# Patient Record
Sex: MALE | Race: WHITE
[De-identification: names, ages, dates, MRNs, and addresses within clinical notes are randomized per-mention and may not be internally consistent; named-entity substitution may affect disease eponyms.]

---

## 2018-10-30 ENCOUNTER — HOSPITAL ENCOUNTER (EMERGENCY)
Dept: HOSPITAL 75 - ER | Age: 23
Discharge: HOME | End: 2018-10-30
Payer: COMMERCIAL

## 2018-10-30 VITALS — WEIGHT: 175 LBS | BODY MASS INDEX: 29.88 KG/M2 | HEIGHT: 64 IN

## 2018-10-30 VITALS — DIASTOLIC BLOOD PRESSURE: 83 MMHG | SYSTOLIC BLOOD PRESSURE: 127 MMHG

## 2018-10-30 DIAGNOSIS — Z88.8: ICD-10-CM

## 2018-10-30 DIAGNOSIS — Z88.1: ICD-10-CM

## 2018-10-30 DIAGNOSIS — K92.1: Primary | ICD-10-CM

## 2018-10-30 LAB
ALBUMIN SERPL-MCNC: 4.6 GM/DL (ref 3.2–4.5)
ALP SERPL-CCNC: 90 U/L (ref 40–136)
ALT SERPL-CCNC: 49 U/L (ref 0–55)
BASOPHILS # BLD AUTO: 0 10^3/UL (ref 0–0.1)
BASOPHILS NFR BLD AUTO: 0 % (ref 0–10)
BILIRUB SERPL-MCNC: 0.3 MG/DL (ref 0.1–1)
BUN/CREAT SERPL: 16
CALCIUM SERPL-MCNC: 9.5 MG/DL (ref 8.5–10.1)
CHLORIDE SERPL-SCNC: 108 MMOL/L (ref 98–107)
CO2 SERPL-SCNC: 23 MMOL/L (ref 21–32)
CREAT SERPL-MCNC: 0.9 MG/DL (ref 0.6–1.3)
EOSINOPHIL # BLD AUTO: 0.1 10^3/UL (ref 0–0.3)
EOSINOPHIL NFR BLD AUTO: 1 % (ref 0–10)
ERYTHROCYTE [DISTWIDTH] IN BLOOD BY AUTOMATED COUNT: 12.3 % (ref 10–14.5)
GFR SERPLBLD BASED ON 1.73 SQ M-ARVRAT: > 60 ML/MIN
GLUCOSE SERPL-MCNC: 95 MG/DL (ref 70–105)
HCT VFR BLD CALC: 43 % (ref 40–54)
HGB BLD-MCNC: 15.1 G/DL (ref 13.3–17.7)
LYMPHOCYTES # BLD AUTO: 1.7 X 10^3 (ref 1–4)
LYMPHOCYTES NFR BLD AUTO: 23 % (ref 12–44)
MANUAL DIFFERENTIAL PERFORMED BLD QL: NO
MCH RBC QN AUTO: 29 PG (ref 25–34)
MCHC RBC AUTO-ENTMCNC: 35 G/DL (ref 32–36)
MCV RBC AUTO: 81 FL (ref 80–99)
MONOCYTES # BLD AUTO: 0.6 X 10^3 (ref 0–1)
MONOCYTES NFR BLD AUTO: 8 % (ref 0–12)
NEUTROPHILS # BLD AUTO: 4.9 X 10^3 (ref 1.8–7.8)
NEUTROPHILS NFR BLD AUTO: 68 % (ref 42–75)
PLATELET # BLD: 247 10^3/UL (ref 130–400)
PMV BLD AUTO: 9.7 FL (ref 7.4–10.4)
POTASSIUM SERPL-SCNC: 3.8 MMOL/L (ref 3.6–5)
PROT SERPL-MCNC: 7.4 GM/DL (ref 6.4–8.2)
RBC # BLD AUTO: 5.26 10^6/UL (ref 4.35–5.85)
SODIUM SERPL-SCNC: 139 MMOL/L (ref 135–145)
WBC # BLD AUTO: 7.3 10^3/UL (ref 4.3–11)

## 2018-10-30 PROCEDURE — 80053 COMPREHEN METABOLIC PANEL: CPT

## 2018-10-30 PROCEDURE — 85025 COMPLETE CBC W/AUTO DIFF WBC: CPT

## 2018-10-30 PROCEDURE — 36415 COLL VENOUS BLD VENIPUNCTURE: CPT

## 2018-10-30 NOTE — XMS REPORT
[facility]

 Created on: 10/06/2017



Oliver Owens

External Reference #: [id number]

: 1995

Sex: Male



Demographics







 Address  [address] [place]  [address]

 

 Preferred Language  Unknown

 

 Marital Status  Unknown

 

 Taoism Affiliation  Unknown

 

 Race  Unknown

 

 Ethnic Group  Unknown





Author







 Author  Abdias  PADMA

 

 [facility]

 

 Address  [address] [place]  [address]



 

 Phone  [phone]







Care Team Providers







 Care Team Member Name  Role  Phone

 

 roxySEVEN StylesY  Unavailable  [phone]







PROBLEMS







 Type  Condition  ICD9-CM Code  AZZ50-DL Code  Onset Dates  Condition Status  
SNOMED Code

 

 Problem  Schizoaffective disorder, bipolar type     F25.0     Active  01452818

 

 Problem  Anxiety     F41.9     Active  06016564

 

 Problem  Attention deficit hyperactivity disorder (ADHD), predominantly 
inattentive type     F90.0     Active  93589068

 

 Problem  Impulse disorder, unspecified     F63.9     Active  06666214

 

 Problem  ADD (attention deficit disorder)     F90.0     Active  935997397

 

 Problem  Hypercholesterolemia with hyperglyceridemia     E78.2     Active  
[id number]

 

 Problem  Bipolar disorder, current episode mixed, unspecified     F31.60     
Active  [id number]







ALLERGIES







 Substance  Reaction  Event Type  Date  Status

 

 Cipro  hives  Drug Allergy  02 Mar, 2017  Active







SOCIAL HISTORY

Never Assessed



PLAN OF CARE







 Activity  Details









  









 Follow Up  3 Months Reason:







VITAL SIGNS







 Height  64 in  2017

 

 Weight  180.0 lbs  2017

 

 Heart Rate  72 bpm  2017

 

 Respiratory Rate  18   2017

 

 BMI  30.89 kg/m2  2017

 

 Blood pressure systolic  111 mmHg  2017

 

 Blood pressure diastolic  79 mmHg  2017







MEDICATIONS







 Medication  Instructions  Dosage  Frequency  Start Date  End Date  Duration  
Status

 

 Lithium Carbonate 300 MG     TAKE THREE CAPSULES BY MOUTH ONCE DAILY WITH 
DINNER              Active

 

 Latuda 20 MG  Orally BID  1 tablet with food  12h       30 days  
Active







RESULTS

No Results



PROCEDURES

No Known procedures



IMMUNIZATIONS

No Known Immunizations



MEDICAL (GENERAL) HISTORY







 Type  Description  Date

 

 Medical History  depression   

 

 Medical History  adhd   

 

 Medical History  bipolar   

 

 Medical History  hyperlipidemia   

 

 Medical History  Hx of heart valve dysfunction but says this has resolved   

 

 Hospitalization History  [facility] hospital x4, last incident at age 9

## 2018-10-30 NOTE — XMS REPORT
[facility]

 Created on: 2018



Oliver Owens

External Reference #: [id number]

: 1995

Sex: Male



Demographics







 Address  [address] [place]  [address]

 

 Preferred Language  Unknown

 

 Marital Status  Unknown

 

 Zoroastrian Affiliation  Unknown

 

 Race  Unknown

 

 Ethnic Group  Unknown





Author







 Author  PATTY MELGOZA

 

 [facility]

 

 Address  [address] [place]  [address]



 

 Phone  [phone]







Care Team Providers







 Care Team Member Name  Role  Phone

 

 RHONAPATTY  Unavailable  [phone]







PROBLEMS







 Type  Condition  ICD9-CM Code  IIQ90-JE Code  Onset Dates  Condition Status  
SNOMED Code

 

 Problem  ADD (attention deficit disorder)     F90.0     Active  552575111

 

 Problem  Hypercholesterolemia with hyperglyceridemia     E78.2     Active  
973892242

 

 Problem  Schizoaffective disorder, bipolar type     F25.0     Active  03148980

 

 Problem  Gastroesophageal reflux disease, esophagitis presence not specified  
   K21.9     Active  255093833

 

 Problem  Social anxiety disorder     F40.10     Active  31432680

 

 Problem  Bipolar disorder, current episode mixed, unspecified     F31.60     
Active  48185407

 

 Problem  Impulse disorder, unspecified     F63.9     Active  18043275

 

 Problem  Attention deficit hyperactivity disorder (ADHD), predominantly 
inattentive type     F90.0     Active  97975208

 

 Problem  Anxiety     F41.9     Active  54232141







ALLERGIES

No Information



ENCOUNTERS







 Encounter  Location  Date  Diagnosis

 

 [facility] [place]  [address] [place] [address]60-
9041    Gastroesophageal reflux disease, esophagitis presence not 
specified K21.9

 

 [facility], [place]  [address] [place] [address] 16115-
3458    Schizoaffective disorder, bipolar type F25.0

 

 [facility], [place]  [address] [place] [address] 16763-
7034    Schizoaffective disorder, bipolar type F25.0 ; Impulse 
disorder, unspecified F63.9 ; Attention deficit hyperactivity disorder (ADHD), 
inattentive type, mild F90.0 and Social anxiety disorder F40.10

 

 [facility], [place]  [address] [place] [address] 22746-
3879    Schizoaffective disorder, bipolar type F25.0 ; Impulse 
disorder, unspecified F63.9 ; Attention deficit hyperactivity disorder (ADHD), 
inattentive type, mild F90.0 and Social anxiety disorder F40.10

 

 [facility], [place]  [address] [place] [address] 37510-
2225  13 Dec, 2017  Schizoaffective disorder, bipolar type F25.0 ; Impulse 
disorder, unspecified F63.9 ; Attention deficit hyperactivity disorder (ADHD), 
inattentive type, mild F90.0 and Social anxiety disorder F40.10

 

 [place] [address] [place] [address] 88337-
4131  11 Dec, 2017   

 

 [facility], [place]  [id number] [place] [address] 91861-
2592    Schizoaffective disorder, bipolar type F25.0

 

 [place] [id number] [place] [address] 65609-
5472    Schizoaffective disorder, bipolar type F25.0 ; Impulse 
disorder, unspecified F63.9 ; Attention deficit hyperactivity disorder (ADHD), 
inattentive type, mild F90.0 and Social anxiety disorder F40.10

 

 [place] [address] [address]-
8855  12 Oct, 2017  Schizoaffective disorder, bipolar type F25.0 ; Impulse 
disorder, unspecified F63.9 ; Attention deficit hyperactivity disorder (ADHD), 
inattentive type, mild F90.0 and Social anxiety disorder F40.10

 

 [facility], [place]  [address] [address]-
4941  22 Sep, 2017   

 

 [facility] [place]  [address] [place] [address] 24861-
4336  08 Sep, 2017  Schizoaffective disorder, bipolar type F25.0

 

 [facility], [place]  [address] [place] [address] 18927-
6030  07 Sep, 2017  Other long term (current) drug therapy Z79.899

 

 [place] [address] [place] [id number] [place] [address] 24016-
4944  01 Sep, 2017  Schizoaffective disorder, bipolar type F25.0 ; Impulse 
disorder, unspecified F63.9 ; Attention deficit hyperactivity disorder (ADHD), 
predominantly inattentive type F90.0 ; Anxiety F41.9 and Other long term (
current) drug therapy Z79.899

 

 [facility], [place]  [id number] [place] [address] 66665-
4230  11 Aug, 2017   

 

 [facility], [place]  [id number] [place] [address] 84912-
6165    Hypercholesterolemia with hyperglyceridemia E78.2

 

 [place] [id number] [place] [address] 68559-
9013    Schizoaffective disorder, bipolar type F25.0 and Impulse 
disorder, unspecified F63.9

 

 [facility], [place]  [id number] [place] [address] 59592-
0993    Hypercholesterolemia with hyperglyceridemia E78.2 ; Impulse 
disorder, unspecified F63.9 ; Bipolar disorder, current episode mixed, 
unspecified F31.60 ; Schizoaffective disorder, bipolar type F25.0 and ADD (
attention deficit disorder) F90.0

 

 [place] [address] [place] [address] 24169-
0690  17 Mar, 2017   

 

 [facility] [place]  [id number] [place] [address] 88207-
6075  13 Mar, 2017   

 

 [facility] [place]  [id number] [place] [address] 15159-
7115  08 Mar, 2017  Schizoaffective disorder, bipolar type F25.0

 

 [facility] [place]  [id number] [place] [address] 99841-
6502  06 Mar, 2017   

 

 [facility] [place]  [id number] [place] [address] 78112-
0804  06 Mar, 2017   

 

 [facility] [place]  [id number] [place] [address] 85184-
6817  02 Mar, 2017  Schizoaffective disorder, bipolar type F25.0

 

 [facility] [place]  [address] [address]-
2882  02 Mar, 2017  Schizoaffective disorder, bipolar type F25.0

 

 [facility], [place]  [address]005[address] [address]-
0972  10 Nov, 2016   

 

 [facility] [place]  [id number] [place] [address] 74385-
5626     

 

 [facility] [place]  [id number] [place] [address] 73332-
3166    Schizoaffective disorder, bipolar type F25.0 and Other long 
term (current) drug therapy Z79.899

 

 [facility] [place]  [id number] [place] [address] 42218-
8898    ADD (attention deficit disorder) F90.0

 

 [facility] [place]  [id number] [place] [address] 58156-
5210    ADD (attention deficit disorder) F90.0

 

 [facility] [place]  [address] [place] [address] 24179-
1948    Schizoaffective disorder, bipolar type F25.0 and ADD (
attention deficit disorder) F90.0

 

 [facility], [place]  [address] [address]-
0914     

 

 [facility] [place]  [address] [address]-
9277  27 Oct, 2015   

 

 [facility] [place]  [address] [address]-
8672  09 Oct, 2015  Encounter for long-term (current) use of other medications 
Z79.899

 

 [facility] [place]  [address] [place] [address] 74391-
0817  07 Oct, 2015  Schizoaffective disorder, bipolar type F25.0 ; Anxiety 
disorder, unspecified F41.9 ; Impulse disorder, unspecified F63.9 and Encounter 
for long-term (current) use of other medications Z79.899

 

 [facility]  [address] [id number] [place] [address]-
0213  26 Aug, 2015   

 

 [facility] [place] [facility]  [address] [id number] [place] [address]-
7204  26 Aug, 2015   

 

 [facility] [place] [facility]  [address] [facility] [id number] [place] [address]-
6954     

 

 [facility] [place] [facility]  [address] [id number] [address]-
1189     

 

 [facility] [place] [facility]  [address] [id number] [place] [address]-
2563  12 May, 2015   

 

 [facility] [place] [facility]  [address] [id number] [address]-
6713     

 

 [facility] [place] [facility]  [address] [place] [address]-
5977     

 

 [facility] [place] [facility]  [id number] [address]-
7881  04 Mar, 2015   

 

 [facility] [place] [facility]  [id number] [address]-
6787  04 Mar, 2015   

 

 [facility] [place] [facility]  [id number] [address]-
9205     

 

 [facility] [place] [facility]  [id number] [address]-
1991     

 

 [facility] [place] [facility]  [address] [facility] [id number] [address]-
5842     

 

 [facility] [place] [facility]  [address] [id number] [address]-
4507     

 

 [facility] [place] [facility]  [address] [facility] [id number] [address]-
3877     

 

 [facility] [place] [facility]  [address] [facility] [id number] [address]-
7038     

 

 [facility] [place] [facility]  [address] [id number] [place] [address]-
3246     

 

 [facility]  [address] [id number] [place] [address]-
1242     

 

 [facility] [place] [facility]  [address] [id number] [place] [address]-
7380     

 

 [facility]  [address] [id number] [place] [address]-
6947     

 

 [facility] [place] [facility]  [address] [id number] [place] [address]-
7101  22 Dec, 2014   

 

 [facility]  [address] [id number] [place] [address]-
2327  22 Dec, 2014   

 

 [facility] [place] [facility]  [address] [id number] [place] [address]-
8288  02 Dec, 2014   

 

 [facility] [place] [facility]  [address] [id number] [place] [address]-
4613  02 Dec, 2014   

 

 [facility]  [address] [id number] [place] [address]-
5454  01 Dec, 2014   

 

 [facility] [place] [facility]  [address] [id number] [place] [address]-
3526  01 Dec, 2014   

 

 [facility]  [address] [id number] [place] [address]-
7163     

 

 [facility] [place] [facility]  [address] [id number] [place] [address]-
9426     

 

 [facility] [place] [facility]  [address] [id number] [place] [address]-
2306     

 

 [facility] [place] [facility]  [address] [id number] [place] [address]-
1936     

 

 [facility] [place] [facility]  [address] [id number] [place] [address]-
1016  30 Oct, 2014   

 

 [facility] [place] [facility]  [address] [id number] [place] [address]-
6087  30 Oct, 2014   

 

 [facility] [place] [facility]  [address] [id number] [place] [address]-
5410  27 Oct, 2014   

 

 [facility] [place] [facility]  [address] [id number] [place] [address]-
1448  27 Oct, 2014   

 

 [facility] [place] [facility]  [address] [id number] [place]
9533  06 Oct, 2014   

 

 [facility] [place] [facility]  [address] [id number] [place] [address]-
7466  06 Oct, 2014   

 

 [facility] [place] [facility]  [address] [id number] [place] [address]-
2031  03 Oct, 2014   

 

 [facility] [place] [facility]  [address] [id number] [place] [address]-
5753  03 Oct, 2014   

 

 [facility] [place] [facility]  [address] [id number] [place] [address]-
2107  05 Sep, 2014   

 

 [facility] [place] [facility]  [address] [id number] [place] [address]-
5028  05 Sep, 2014   

 

 [facility] [place] [facility]  [address] [id number] [place] [address]-
7194  05 Sep, 2014   

 

 [facility] [place] [facility]  [address] [id number] [place] [address]-
1284  05 Sep, 2014   

 

 [facility] [place] [facility]  [address] [id number] [place] [address]-
6071  04 Aug, 2014   

 

 [facility] [place] [facility]  [address] [id number] [address]-
0571  04 Aug, 2014   

 

 [facility] [place] [facility]  [address] [id number] [place] [address]-
8740     

 

 [facility] [place] [facility]  [address] [id number] [place] [address]-
7202     

 

 [facility] [place] [facility]  [address] [id number] [place] [address]-
6491     

 

 [facility] [place] [facility]  [address] [id number] [place] [address]-
9205     

 

 [facility] [place] [facility]  [address] [id number] [place] [address]-
2366     

 

 [facility] [place] [facility]  [address] [id number] [place] [address]-
4841     

 

 [facility] [place] [facility]  [address] [id number] [place] [address]-
5543     

 

 [facility] [place] [facility]  [address] [id number] [place] [address]-
0746     

 

 [facility] [place] [facility]  [address] [id number] [place] [address]-
5487     

 

 [facility] [place] [facility]  [address] [id number] [place] [address]-
0207     

 

 [facility] [place] [facility]  [address] [id number] [place] [address]-
6722  09 May, 2014   

 

 [facility] [place] [facility]  [address] [id number] [place] [address]-
4814  09 May, 2014   

 

 [facility] [place] [facility]  [address] [id number] [place] [address]-
6478     

 

 [facility] [place] [facility]  [address] [id number] [place] [address]-
6799     

 

 [facility] [place] [facility]  [address] [id number] [place] [address]-
1736     

 

 [facility] [place] [facility]  [address] [id number] [place] [address]-
4214     

 

 [facility] [place] [facility]  [address] [id number] [place] [address]-
8947     

 

 [facility] [place] [facility]  [address] [id number] [place] [address]-
1645  06 Mar, 2014   

 

 [facility] [place] [facility]  [address] [id number] [place] [address]-
1993  06 Mar, 2014   

 

 [facility] [place] [facility]  [address] [id number] [place] [address]-
8191     

 

 [facility]  [address] [id number] [place] [address]-
5989     

 

 [facility] [place] [facility]  [address] [id number] [place] [address]-
2634  10 Chidi, 2014   

 

 [facility] [place] [facility]  [address] [id number] [place] [address]-
4326  10 Chidi, 2014   

 

 [facility] [place] [facility]  [address] [id number] [place] [address]-
2641  23 Dec, 2013   

 

 [facility] [place] [facility]  [address] [id number] [place] [address]-
3397  23 Dec, 2013   

 

 [facility]  [address] [facility] [id number] [place] [address]-
6015  20 Dec, 2013   

 

 [facility] [place] [facility]  [address] [id number] [place] [address]-
6760  20 Dec, 2013   

 

 [facility]  [address] [id number] [place] [address]-
0934  19 Dec, 2013   

 

 [facility] [place] [facility]  [address] [id number] [place] [address]-
1860  05 Dec, 2013   

 

 [facility] [place] [facility]  [address] [id number] [place] [address]-
3093  05 Dec, 2013   

 

 [facility] [place] [facility]  [address] [id number] [place] [address]-
0720  03 Dec, 2013   

 

 [facility] [place] [facility]  [address] [id number] [place] [address]-
5212  03 Dec, 2013   

 

 [facility] [place] [facility]  [address] [id number] [place] [address]-
2486  18 Sep, 2013   

 

 [facility] [place] [facility]  [address] [id number] [place] [address]-
8336     

 

 [facility] [place] [facility]  [address] [id number] [address]-
8676     

 

 [facility]  [address] [id number] [place] [address]-
3923     

 

 [facility]  [address] [id number] [place] [address]
2546  09 May, 2013   

 

 [facility]  [address] [id number] [place] [address]
2546     

 

 [facility] [place] [facility]  [address] [id number] [address]
2546  18 Mar, 2013   

 

 [facility]  [address] [id number] [place] [address]-
1273  07 Mar, 2013   

 

 [facility]  [address] [id number] [place] [address]
2546  05 Mar, 2013   

 

 [facility]  [address] [id number] [place] [address]-
6616     

 

 [facility] [place] [facility]  [address] [id number] [place] [address]-
5514     

 

 [facility]  [address] [id number] [place] [address]-
4249     

 

 [facility]  [address] [id number] [place] [address]
2546     

 

 [facility]  [address] [id number] [address]-
3459  18 Dec, 2012   

 

 [facility] [place] [facility]  [address] [id number] [address]
2546  18 Dec, 2012   

 

 [facility] [place] [facility]  [address] [id number] [place] [address]-
2546  06 Dec, 2012   

 

 [facility] [place] [facility]  [address] [id number] [place] [address]
2546  04 Dec, 2012   

 

 [facility] [place] [facility]  [address] [id number] [place] [address]-
2546  04 Dec, 2012   

 

 [facility] [place] [facility]  [address] [id number] [place] [address]-
4391     

 

 [facility] [place] [facility]  [address] [id number] [place] [address]-
2421     

 

 [facility] [place] [facility]  [address] [id number] [place] [address]-
4282  31 Oct, 2012   

 

 [facility] [place] [facility]  [address] [id number] [place] [address]-
6636  31 Oct, 2012   

 

 [facility] [place] [facility]  [address] [id number] [place] [address]-
4493  26 Oct, 2012   

 

 [facility] [place] [facility]  [address] [id number] [place] [address]-
2066  26 Oct, 2012   

 

 [facility] [place] [facility]  [address] [id number] [place]
7604  23 Oct, 2012   

 

 [facility] [place] [facility]  [address] [id number] [place] [address]-
7676  23 Oct, 2012   

 

 [facility] [place] [facility]  [address] [id number] [place] [address]-
1268  18 Sep, 2012   

 

 [facility] [place] [facility]  [address] [id number] [place] [address]-
3466  23 Aug, 2012   

 

 [facility] [place] [facility]  [address] [id number] [place] [address]-
4413  22 Aug, 2012   

 

 [facility] [place] [facility]  [address] [id number] [place] [address]-
3617     

 

 [facility] [place] [facility]  [address] [id number] [place] [address]-
0779     

 

 [facility] [place] [facility]  [address] [id number] [place] [address]-
2391     

 

 [facility] [place] [facility]  [address] [id number] [place] [address]-
2546     

 

 [facility] [place] [facility]  [address] [id number] [place] [address]-
8506  01 May, 2012   

 

 [facility]  [address] [id number] [place] [address]-
5089     

 

 [facility] [place] [facility]  [address] [id number] [place] [address]-
7191  10 Apr, 2012   

 

 [facility] [place] [facility]  [address] [id number] [place] [address]-
7375  14 Mar, 2012   

 

 [facility] [place] [facility]  [address] [id number] [place] [address]-
8168     

 

 [facility] [place] [facility]  [address] [id number] [place] [address]-
4149     

 

 [facility] [place] [facility]  [address] [id number] [place] [address]-
0610  26 Dec, 2011   

 

 [facility] [place] [facility]  [address] [id number] [place] [address]-
8029  21 Dec, 2011   

 

 [facility] [place] [facility]  [address] [id number] [place] [address]-
2755  19 Dec, 2011   

 

 [facility] [place] [facility]  [address] [id number] [place] [address]-
1594  16 Dec, 2011   

 

 [facility] [place] [facility]  [address] [id number] [place] [address]-
9985  16 Dec, 2011   

 

 [facility] [place] [facility]  [address] [id number] [place] [address]-
6306  14 Dec, 2011   

 

 [facility] [place] [facility]  [address] [id number] [place] [address]-
7909  14 Dec, 2011   

 

 [facility] [place] [facility]  [address] [id number] [place] [address]-
8310  17 2011   

 

 [facility] [place] [facility]  [address] [id number] [place] [address]-
5716  17 2011   

 

 [facility] [place] [facility]  [address] [id number] [place] [address]-
0249  18 Oct, 2011   

 

 [facility] [place] [facility]  [address] [id number] [address]
7229  10 Dec, 2010   

 

 [facility] [place]  [address] [facility] [id number] [place] [address]-
9675     

 

 [facility] [place]  [address] [facility] [id number] [address]-
6545     

 

 [facility] [place]  [address] [facility] [id number] [address]-
2768  26 Oct, 2010   







IMMUNIZATIONS

No Known Immunizations



SOCIAL HISTORY

Never Assessed



REASON FOR VISIT

Requests return call



PLAN OF CARE





VITAL SIGNS





MEDICATIONS

No Known Medications



RESULTS

No Results



PROCEDURES

No Known procedures



INSTRUCTIONS





MEDICATIONS ADMINISTERED

No Known Medications



MEDICAL (GENERAL) HISTORY







 Type  Description  Date

 

 Medical History  depression   

 

 Medical History  adhd   

 

 Medical History  bipolar   

 

 Medical History  hyperlipidemia   

 

 Medical History  Hx of heart valve dysfunction but says this has resolved   

 

 Medical History  lactose intolerant   

 

 Hospitalization History  [facility] x4, last incident at age 9

## 2018-10-30 NOTE — XMS REPORT
[facility]

 Created on: 2016



Oliver Owens

External Reference #: [id number]

: 1995

Sex: Male



Demographics







 Address  [address] [place]  [address]

 

 Home Phone  [phone]

 

 Preferred Language  Unknown

 

 Marital Status  Unknown

 

 [employer] Affiliation  Unknown

 

 Race  White

 

 Ethnic Group  Not  or 





Author







 Author  KATHERYN MORGAN

 

 [facility]  [employer]

 

 Address  Unknown

 

 Phone  Unavailable







Care Team Providers







 Care Team Member Name  Role  Phone

 

 KATHERYN MORGAN  CP  Unavailable



                                                                



Allergies

          No Known Allergies                                                   
                                     



Problems

          





 Problem Type  Condition  Code  Onset Dates  Condition Status

 

 Problem  Anxiety state, unspecified  300.00     Active

 

 Problem  Unspecified psychosis  298.9     Active

 

 Problem  Schizoaffective disorder, unspecified  295.70     Active

 

 Problem  Bipolar I disorder, most recent episode (or current) mixed, 
unspecified  296.60     Active

 

 Problem  Impulse control disorder, unspecified  312.30     Active

 

 Problem  Other dysfunctions of sleep stages or arousal from sleep  307.47     
Active



                                                                               
                                                           



Medications

          





 Medication  Code System  Code  Instructions  Start Date  End Date  Status  
Dosage

 

 Lithium Carbonate  NDC  [address]-56  300 MG Orally Once a day with dinner   
        3 capsules

 

 Seroquel XR  NDC  [address]-59  200 MG Orally Once a day           1 tablet 
in the evening



                                                                               
         



Results

          No Known Results                                                     
               



Summary Purpose

          eClinical[employer] Submission

## 2018-10-30 NOTE — XMS REPORT
[facility]

 Created on: 2016



Oliver Owens

External Reference #: [id number]

: 1995

Sex: Male



Demographics







 Address  [address] [place]  [address]

 

 Home Phone  [phone]

 

 Preferred Language  Unknown

 

 Marital Status  Unknown

 

 [employer] Affiliation  Unknown

 

 Race  White

 

 Ethnic Group  Not  or 





Author







 KATHERYN Carty

 

 [facility]  [employer]

 

 Address  Unknown

 

 Phone  Unavailable







Care Team Providers







 Care Team Member Name  Role  Phone

 

 KATHERYN MORGAN  CP  Unavailable



                                                                



Allergies

          No Known Allergies                                                   
                                     



Problems

          





 Problem Type  Condition  Code  Onset Dates  Condition Status

 

 Problem  ADD (attention deficit disorder)  F90.0     Active

 

 Problem  Impulse control disorder, unspecified  312.30     Active

 

 Problem  Schizoaffective disorder, bipolar type  F25.0     Active

 

 Problem  Bipolar I disorder, most recent episode (or current) mixed, 
unspecified  296.60     Active

 

 Assessment  ADD (attention deficit disorder)  F90.0     Active



                                                                               
                                                 



Medications

          





 Medication  Code System  Code  Instructions  Start Date  End Date  Status  
Dosage

 

 Vyvanse  NDC  [address]-30  30 MG Orally Once a day qAM for ADHD    Take for 
30 days then start Vyvanse 40 mg  May 12, 2015        1 capsule



                                                                              



Results

          No Known Results                                                     
               



Summary Purpose

          eClinicalWorks Submission

## 2018-10-30 NOTE — XMS REPORT
[facility]

 Created on: 2018



Oliver Owens

External Reference #: [id number]

: 1995

Sex: Male



Demographics







 Address  [address] [place]  [address]

 

 Preferred Language  Unknown

 

 Marital Status  Unknown

 

 [employer] Affiliation  Unknown

 

 Race  Unknown

 

 Ethnic Group  Unknown





Author







 Author  RHONA  PATTY

 

 [facility]

 

 Address  [address] [place]  [address]



 

 Phone  [phone]







Care Team Providers







 Care Team Member Name  Role  Phone

 

 RHONAPATTY  Unavailable  [phone]







PROBLEMS







 Type  Condition  ICD9-CM Code  CBT98-TL Code  Onset Dates  Condition Status  
SNOMED Code

 

 Problem  Schizoaffective disorder, bipolar type     F25.0     Active  81893580

 

 Problem  ADD (attention deficit disorder)     F90.0     Active  658933948

 

 Problem  Social anxiety disorder     F40.10     Active  75968272

 

 Problem  Attention deficit hyperactivity disorder (ADHD), predominantly 
inattentive type     F90.0     Active  71731391

 

 Problem  Impulse disorder, unspecified     F63.9     Active  53267630

 

 Problem  Hypercholesterolemia with hyperglyceridemia     E78.2     Active  
[id number]

 

 Problem  Anxiety     F41.9     Active  77405006

 

 Problem  Bipolar disorder, current episode mixed, unspecified     F31.60     
Active  73204106







ALLERGIES

No Information



ENCOUNTERS







 Encounter  Location  Date  Diagnosis

 

 [facility]  [id number] [place] [address]-
1359     

 

 [facility]  [id number] [place] [address] 04927-
0363     

 

 [facility]  [id number] [place] [address] 86300-
1900    Schizoaffective disorder, bipolar type F25.0 ; Impulse 
disorder, unspecified F63.9 ; Attention deficit hyperactivity disorder (ADHD), 
inattentive type, mild F90.0 and Social anxiety disorder F40.10

 

 [facility]  [id number] [place] [address] 54403-
1652    Schizoaffective disorder, bipolar type F25.0 ; Impulse 
disorder, unspecified F63.9 ; Attention deficit hyperactivity disorder (ADHD), 
inattentive type, mild F90.0 and Social anxiety disorder F40.10

 

 [facility]  [address] [address]-
2175  13 Dec, 2017  Schizoaffective disorder, bipolar type F25.0 ; Impulse 
disorder, unspecified F63.9 ; Attention deficit hyperactivity disorder (ADHD), 
inattentive type, mild F90.0 and Social anxiety disorder F40.10

 

 [facility]  [address] [address]-
9262  11 Dec, 2017   

 

 [facility]  [address] [place] [address] 90655-
9117    Schizoaffective disorder, bipolar type F25.0

 

 [place] [id number] [place] [address] 11364-
6321    Schizoaffective disorder, bipolar type F25.0 ; Impulse 
disorder, unspecified F63.9 ; Attention deficit hyperactivity disorder (ADHD), 
inattentive type, mild F90.0 and Social anxiety disorder F40.10

 

 [address] [place] [address] [address]-
9139  12 Oct, 2017  Schizoaffective disorder, bipolar type F25.0 ; Impulse 
disorder, unspecified F63.9 ; Attention deficit hyperactivity disorder (ADHD), 
inattentive type, mild F90.0 and Social anxiety disorder F40.10

 

 [place] [address] [address]-
1027  22 Sep, 2017   

 

 [address] [address] [address]-
8700  08 Sep, 2017  Schizoaffective disorder, bipolar type F25.0

 

 [facility]  [address] [address]-
1612  07 Sep, 2017  Other long term (current) drug therapy Z79.899

 

 [facility]  [address] [address]-
8793  01 Sep, 2017  Schizoaffective disorder, bipolar type F25.0 ; Impulse 
disorder, unspecified F63.9 ; Attention deficit hyperactivity disorder (ADHD), 
predominantly inattentive type F90.0 ; Anxiety F41.9 and Other long term (
current) drug therapy Z79.899

 

 [facility]  [id number] [place] [address] 72466-
7560  11 Aug, 2017   

 

 [facility]  [id number] [place] [address] 88495-
8146    Hypercholesterolemia with hyperglyceridemia E78.2

 

 [facility]  [id number] [place] [address] 45628-
5360    Schizoaffective disorder, bipolar type F25.0 and Impulse 
disorder, unspecified F63.9

 

 [place] [id number] [place] [address] 67232-
1499    Hypercholesterolemia with hyperglyceridemia E78.2 ; Impulse 
disorder, unspecified F63.9 ; Bipolar disorder, current episode mixed, 
unspecified F31.60 ; Schizoaffective disorder, bipolar type F25.0 and ADD (
attention deficit disorder) F90.0

 

 [facility]  [id number] [place] [address] 56904-
2959  17 Mar, 2017   

 

 [facility]  [id number] [place] [address] 73926-
1521  13 Mar, 2017   

 

 [facility]  [id number] [place] [address] 58327-
2208  08 Mar, 2017  Schizoaffective disorder, bipolar type F25.0

 

 [facility]  [id number] [place] [address] 73291-
7920  06 Mar, 2017   

 

 [facility]  [id number] [place] [address] 20712-
5333  06 Mar, 2017   

 

 [facility]  [id number] [place] [address] 45807-
9534  02 Mar, 2017  Schizoaffective disorder, bipolar type F25.0

 

 [facility]  [id number] [place] [address] 32399-
0063  02 Mar, 2017  Schizoaffective disorder, bipolar type F25.0

 

 [facility]  [address] [address]-
8204  10 Nov, 2016   

 

 [facility]  [id number] [place] [address] 98135-
5735     

 

 [facility]  [id number] [place] [address] 43118-
4498    Schizoaffective disorder, bipolar type F25.0 and Other long 
term (current) drug therapy Z79.899

 

 [facility]  [id number] [place] [address] 20923-
8047    ADD (attention deficit disorder) F90.0

 

 [facility]  [address] [place] [address] 23031-
8175    ADD (attention deficit disorder) F90.0

 

 [facility]  [id number] [place] [address] 04755-
0724    Schizoaffective disorder, bipolar type F25.0 and ADD (
attention deficit disorder) F90.0

 

 [facility]  [address] [place] [address] 21797-
6017     

 

 [facility]  [address] [place] [address] 31296-
2046  27 Oct, 2015   

 

 [facility]  [address] [place] [address] 89089-
2338  09 Oct, 2015  Encounter for long-term (current) use of other medications 
Z79.899

 

 [facility]  [address] [place] [address] 41249-
2177  07 Oct, 2015  Schizoaffective disorder, bipolar type F25.0 ; Anxiety 
disorder, unspecified F41.9 ; Impulse disorder, unspecified F63.9 and Encounter 
for long-term (current) use of other medications Z79.899

 

 [facility]  [address] [address]-
9418  26 Aug, 2015   

 

 [facility] [place] [facility]  [address] [id number] [place] [address]-
4914  26 Aug, 2015   

 

 [facility] [place] [facility]  [address] [id number] [place] [address]-
8284     

 

 [facility] [place] [facility]  [address] [id number] [place] [address]-
1534     

 

 [facility] [place] [facility]  [address] [id number] [place] [address]-
1299  12 May, 2015   

 

 [facility] [place] [facility]  [address] [id number] [place] [address]-
9609     

 

 [facility] [place] [facility]  [address] [id number] [place] [address]-
8527     

 

 [facility] [place] [facility]  [address] [facility] [id number] [place] [address]-
2389  04 Mar, 2015   

 

 [facility] [place] [facility]  [address] [facility] [id number] [place] [address]-
1936  04 Mar, 2015   

 

 [facility] [place] [facility]  [address] [id number] [place] [address]-
1995     

 

 [facility] [place] [facility]  [address] [facility] [id number] [place] [address]-
7387     

 

 [facility] [place] [facility]  [address] [facility] [id number] [place] [address]-
7292     

 

 [facility] [place] [facility]  [address] [facility] [id number] [place] [address]-
5374     

 

 [facility] [place] [facility]  [address] [facility] [id number] [place]
9892     

 

 [facility] [place] [facility]  [address] [id number] [place] [address]-
3330     

 

 [facility] [place] [facility]  [address] [facility] [id number] [place] [address]-
4595     

 

 [facility] [place] [facility]  [address] [facility] [id number] [place] [address]-
9618     

 

 [facility] [place] [facility]  [address] [id number] [place] [address]-
4121     

 

 [facility] [place] [facility]  [address] [id number] [place] [address]-
0423     

 

 [facility] [place] [facility]  [address] [id number] [place] [address]-
5756  22 Dec, 2014   

 

 [facility] [place] [facility]  [address] [id number] [place] [address]-
8810  22 Dec, 2014   

 

 [facility] [place] [facility]  [address] [id number] [place] [address]-
9686  02 Dec, 2014   

 

 [facility] [place] [facility]  [address] [id number] [place] [address]-
8604  02 Dec, 2014   

 

 [facility] [place] [facility]  [address] [id number] [place] [address]-
8668  01 Dec, 2014   

 

 [facility] [place] [facility]  [address] [id number] [place] [address]-
1994  01 Dec, 2014   

 

 [facility] [place] [facility]  [address] [id number] [place] [address]-
6828     

 

 [facility] [place] [facility]  [address] [id number] [place] [address]-
3509     

 

 [facility] [place] [facility]  [address] [id number] [address]-
9625     

 

 [facility] [place] [facility]  [address] [id number] [address]-
9813     

 

 [facility] [place] [facility]  [address] [id number] [place] [address]-
2533  30 Oct, 2014   

 

 [facility] [place] [facility]  [address] [id number] [place] [address]-
8584  30 Oct, 2014   

 

 [facility] [place] [facility]  [address] [id number] [place] [address]-
5599  27 Oct, 2014   

 

 [facility] [place] [facility]  [address] [id number] [place] [address]-
1438  27 Oct, 2014   

 

 [facility] [place] [facility]  [address] [id number] [place] [address]-
4674  06 Oct, 2014   

 

 [facility] [place] [facility]  [address] [id number] [place] [address]-
2148  06 Oct, 2014   

 

 [facility] [place] [facility]  [address] [id number] [place] [address]-
8807  03 Oct, 2014   

 

 [facility] [place] [facility]  [address] [id number] [place] [address]-
4408  03 Oct, 2014   

 

 [facility] [place] [facility]  [address] [id number] [place]
8817  05 Sep,    

 

 [facility] [place] [facility]  [address] [id number] [place] [address]-
7683  05 Sep,    

 

 [facility] [place] [facility]  [address] [id number] [place] [address]-
1870  05 Sep,    

 

 [facility] [place] [facility]  [address] [id number] [place] [address]-
0703  05 Sep, 2014   

 

 [facility] [place] [facility]  [address] [id number] [place] [address]-
4419  04 Aug, 2014   

 

 [facility] [place] [facility]  [address] [id number] [place] [address]-
4281  04 Aug, 2014   

 

 [facility] [place] [facility]  [address] [id number] [place] [address]-
3283     

 

 [facility] [place] [facility]  [address] [id number] [place] [address]-
8919     

 

 [facility] [place] [facility]  [address] [id number] [place] [address]-
4117     

 

 [facility] [place] [facility]  [address] [id number] [place] [address]-
5965     

 

 [facility] [place] [facility]  [address] [id number] [place] [address]-
0262     

 

 [facility] [place] [facility]  [address] [id number] [place] [address]-
5485     

 

 [facility] [place] [facility]  [address] [id number] [place] [address]-
3943     

 

 [facility] [place] [facility]  [address] [id number] [place] [address]-
5222     

 

 [facility] [place] [facility]  [address] [id number] [place] [address]-
2724     

 

 [facility] [place] [facility]  [address] [id number] [place] [address]-
2198     

 

 [facility] [place] [facility]  [address] [id number] [place] [address]-
9650  09 May, 2014   

 

 [facility] [place] [facility]  [address] [id number] [place] [address]-
9762  09 May, 2014   

 

 [facility] [place] [facility]  [address] [id number] [place] [address]-
9034     

 

 [facility] [place] [facility]  [address] [id number] [place] [address]-
2858     

 

 [facility] [place] [facility]  [address] [id number] [place] [address]-
5039     

 

 [facility] [place] [facility]  [address] [id number] [place] [address]-
7791     

 

 [facility] [place] [facility]  [address] [id number] [place] [address]-
2714     

 

 [facility] [place] [facility]  [address] [id number] [place] [address]-
9383  06 Mar, 2014   

 

 [facility] [place] [facility]  [address] [id number] [place] [address]-
8632  06 Mar, 2014   

 

 [facility] [place] [facility]  [address] [id number] [place] [address]-
0666     

 

 [facility] [place] [facility]  [address] [id number] [place] [address]-
8022  05 2014   

 

 [facility] [place] [facility]  [address] [id number] [place] [address]-
4624  10 2014   

 

 [facility] [place] [facility]  [address] [id number] [place] [address]-
1811  10 2014   

 

 [facility] [place] [facility]  [address] [facility] [id number] [place] [address]-
2414  23 Dec, 2013   

 

 [facility] [place] [facility]  [address] [id number] [place] [address]-
1959  23 Dec, 2013   

 

 [facility] [place] [facility]  [address] [id number] [place] [address]-
0846  20 Dec, 2013   

 

 [facility] [place] [facility]  [address] [id number] [place] [address]-
4630  20 Dec, 2013   

 

 [facility] [place] [facility]  [address] [facility] [id number] [place] [address]-
5815  19 Dec, 2013   

 

 [facility] [place] [facility]  [address] [id number] [place] [address]-
6381  05 Dec, 2013   

 

 [facility] [place] [facility]  [address] [id number] [place] [address]-
4373  05 Dec, 2013   

 

 [facility] [place] [facility]  [address] [facility] [id number] [place] [address]-
4100  03 Dec, 2013   

 

 [facility] [place] [facility]  [address] [id number] [place] [address]-
9328  03 Dec, 2013   

 

 [facility] [place] [facility]  [address] [id number] [address]-
5562  18 Sep, 2013   

 

 [facility] [place] [facility]  [address] [id number] [place] [address]-
7155     

 

 [facility] [place] [facility]  [address] [id number] [place] [address]-
9596  18 2013   

 

 [facility] [place] [facility]  [address] [id number] [address]-
2662  14 2013   

 

 [facility] [place] [facility]  [address] [id number] [place] [address]-
2546  09 May, 2013   

 

 [facility] [place] [facility]  [address] [id number] [place] [address]-
5556     

 

 [facility] [place] [facility]  [address] [id number] [place] [address]-
2546  18 Mar, 2013   

 

 [facility] [place] [facility]  [address] [id number] [place] [address]-
2546  07 Mar, 2013   

 

 [facility] [place] [facility]  [address] [id number] [place] [address]-
2549  05 Mar, 2013   

 

 [facility] [place] [facility]  [address] [id number] [place] [address]-
9346     

 

 [facility] [place] [facility]  [address] [id number] [place] [address]-
3204     

 

 [facility]  [address] [id number] [place] [address]-
7593     

 

 [facility]  [address] [id number] [place] [address]-
8260     

 

 [facility]  [address] [id number] [place] [address]-
5161  18 Dec, 2012   

 

 [facility]  [address] [id number] [place] [address]-
9026  18 Dec, 2012   

 

 [facility] [place] [facility]  [address] [id number] [place] [address]-
2546  06 Dec, 2012   

 

 [facility] [place] [facility]  [address] [id number] [place] [address]-
2546  04 Dec, 2012   

 

 [facility] [place] [facility]  [address] [id number] [place] [address]-
2546  04 Dec, 2012   

 

 [facility] [place] [facility]  [address] [id number] [place] [address]-
2546     

 

 [facility] [place] [facility]  [address] [id number] [place] [address]-
8462     

 

 [facility] [place] [facility]  [address] [id number] [place] [address]-
6308  31 Oct, 2012   

 

 [facility] [place] [facility]  [address] [id number] [place] [address]-
4639  31 Oct, 2012   

 

 [facility] [place] [facility]  [address] [id number] [place] [address]-
2819  26 Oct, 2012   

 

 [facility] [place] [facility]  [address] [id number] [place]
4184  26 Oct, 2012   

 

 [facility] [place] [facility]  [address] [id number] [place] [address]-
4863  23 Oct, 2012   

 

 [facility] [place] [facility]  [address] [id number] [place] [address]-
6466  23 Oct, 2012   

 

 [facility] [place] [facility]  [address] [id number] [place] [address]-
1591  18 Sep, 2012   

 

 [facility] [place] [facility]  [address] [id number] [place] [address]-
2681  23 Aug, 2012   

 

 [facility] [place] [facility]  [address] [id number] [place] [address]-
8275  22 Aug, 2012   

 

 [facility] [place] [facility]  [address] [id number] [place] [address]-
3718     

 

 [facility] [place] [facility]  [address] [id number] [place] [address]-
0512     

 

 [facility] [place] [facility]  [address] [id number] [place] [address]-
8642     

 

 [facility] [place] [facility]  [address] [id number] [place] [address]-
2973     

 

 [facility] [place] [facility]  [address] [id number] [place] [address]-
0539  01 May, 2012   

 

 [facility] [place] [facility]  [address] [id number] [place] [address]-
8817     

 

 [facility] [place] [facility]  [address] [id number] [place] [address]-
9432  10 2012   

 

 [facility]  [address] [id number] [place] [address]-
1840  14 Mar, 2012   

 

 [facility] [place] [facility]  [address] [id number] [place] [address]-
7206  31 2012   

 

 [facility] [place] [facility]  [address] [id number] [place] [address]-
5451  17 2012   

 

 [facility] [place] [facility]  [address] [id number] [place] [address]-
8797  26 Dec, 2011   

 

 [facility]  [address] [id number] [place] [address]-
4622  21 Dec, 2011   

 

 [facility]  [address] [id number] [place] [address]-
6879  19 Dec, 2011   

 

 [facility]  [address] [id number] [place] [address]-
1076  16 Dec, 2011   

 

 [facility]  [address] [id number] [place] [address]-
2914  16 Dec, 2011   

 

 [facility]  [address] [id number] [place] [address]-
4969  14 Dec, 2011   

 

 [facility]  [address] [id number] [place] [address]-
5942  14 Dec, 2011   

 

 [facility]  [address] [id number] [place] [address]-
9576  17 2011   

 

 [facility]  [address] [id number] [place] [address]-
4027  17 2011   

 

 [facility] [place] [facility]  [address] [id number] [place] [address]-
7688  18 Oct, 2011   

 

 [facility] [place] [facility]  [address] [id number] [place] [address]-
2546  10 Dec, 2010   

 

 [facility]  [address] [id number] [place] [address]-
5267     

 

 [facility]  [address] [facility] [id number] [place] [address]-
9746     

 

 [facility]  [address] [facility] [id number] [place] [address]-
5456  26 Oct, 2010   







IMMUNIZATIONS

No Known Immunizations



SOCIAL HISTORY

Never Assessed



REASON FOR VISIT

Lab (walk-in)



PLAN OF CARE





VITAL SIGNS





MEDICATIONS

Unknown Medications



RESULTS







 Name  Result  Date  Reference Range

 

 LITHIUM (ESKALITH(R)), SERUM     2017   

 

 Lithium (Eskalith(R)), Serum  0.8     0.6-1.2

 

 TSH W/ FREE T4     2017   

 

 TSH  1.810     0.450-4.500

 

 T4,Free(Direct)  1.16     0.82-1.77







PROCEDURES







 Procedure  Date Ordered  Result  Body Site

 

 ASSAY OF LITHIUM  2017      

 

 ASSAY THYROID STIM HORMONE  2017      

 

 VENIPUNCT, ROUTINE*  2017      

 

 ASSAY OF FREE THYROXINE  2017      







INSTRUCTIONS





MEDICATIONS ADMINISTERED

No Known Medications



MEDICAL (GENERAL) HISTORY







 Type  Description  Date

 

 Medical History  depression   

 

 Medical History  adhd   

 

 Medical History  bipolar   

 

 Medical History  hyperlipidemia   

 

 Medical History  Hx of heart valve dysfunction but says this has resolved   

 

 Medical History  lactose intolerant   

 

 Hospitalization History  [facility] x4, last incident at age 9

## 2018-10-30 NOTE — XMS REPORT
[facility]

 Created on: 2018



Oliver Owens

External Reference #: [id number]

: 1995

Sex: Male



Demographics







 Address  [address] [place]  [address]

 

 Preferred Language  Unknown

 

 Marital Status  Unknown

 

 Confucianist Affiliation  Unknown

 

 Race  Unknown

 

 Ethnic Group  Unknown





Author







 Author  RHONA  PATTY

 

 [facility]

 

 Address  [address] [place]  [address]



 

 Phone  [phone]







Care Team Providers







 Care Team Member Name  Role  Phone

 

 RHONAPATTY  Unavailable  [phone]







PROBLEMS







 Type  Condition  ICD9-CM Code  HFL96-SI Code  Onset Dates  Condition Status  
SNOMED Code

 

 Problem  ADD (attention deficit disorder)     F90.0     Active  853634423

 

 Problem  Hypercholesterolemia with hyperglyceridemia     E78.2     Active  
888462672

 

 Problem  Schizoaffective disorder, bipolar type     F25.0     Active  71863080

 

 Problem  Gastroesophageal reflux disease, esophagitis presence not specified  
   K21.9     Active  721437501

 

 Problem  Social anxiety disorder     F40.10     Active  50932979

 

 Problem  Bipolar disorder, current episode mixed, unspecified     F31.60     
Active  34525617

 

 Problem  Impulse disorder, unspecified     F63.9     Active  91569212

 

 Problem  Attention deficit hyperactivity disorder (ADHD), predominantly 
inattentive type     F90.0     Active  18489837

 

 Problem  Anxiety     F41.9     Active  82395940







ALLERGIES

No Information



ENCOUNTERS







 Encounter  Location  Date  Diagnosis

 

 [facility]  [address] [place] [address] 48290-
5172     

 

 [facility]  [id number] [place] [address] 20295-
3273    Gastroesophageal reflux disease, esophagitis presence not 
specified K21.9

 

 [facility]  [id number] [place] [address] 52132-
6732    Schizoaffective disorder, bipolar type F25.0

 

 [facility]  [id number] [place] [address] 32172-
8224    Schizoaffective disorder, bipolar type F25.0 ; Impulse 
disorder, unspecified F63.9 ; Attention deficit hyperactivity disorder (ADHD), 
inattentive type, mild F90.0 and Social anxiety disorder F40.10

 

 [facility]  [address], [place] [address]
4237    Schizoaffective disorder, bipolar type F25.0 ; Impulse 
disorder, unspecified F63.9 ; Attention deficit hyperactivity disorder (ADHD), 
inattentive type, mild F90.0 and Social anxiety disorder F40.10

 

 [facility]  [address] [place] [address] 76943-
6812  13 Dec, 2017  Schizoaffective disorder, bipolar type F25.0 ; Impulse 
disorder, unspecified F63.9 ; Attention deficit hyperactivity disorder (ADHD), 
inattentive type, mild F90.0 and Social anxiety disorder F40.10

 

 [place] [id number] [place] [address] 35268-
6799  11 Dec, 2017   

 

 [place] [id number] [place] [address] 99610-
4262    Schizoaffective disorder, bipolar type F25.0

 

 [place] [id number] [place] [address] 23612-
7586    Schizoaffective disorder, bipolar type F25.0 ; Impulse 
disorder, unspecified F63.9 ; Attention deficit hyperactivity disorder (ADHD), 
inattentive type, mild F90.0 and Social anxiety disorder F40.10

 

 [place] [id number], [place] [address]-
4584  12 Oct, 2017  Schizoaffective disorder, bipolar type F25.0 ; Impulse 
disorder, unspecified F63.9 ; Attention deficit hyperactivity disorder (ADHD), 
inattentive type, mild F90.0 and Social anxiety disorder F40.10

 

 [facility]  [address], [place] [address]-
1188  22 Sep, 2017   

 

 [facility]  [id number] [place] [address] 96925-
2706  08 Sep, 2017  Schizoaffective disorder, bipolar type F25.0

 

 [place] [address] [place] [address] 85848-
9730  07 Sep, 2017  Other long term (current) drug therapy Z79.899

 

 [facility]  [address], [place] [address]-
7744  01 Sep, 2017  Schizoaffective disorder, bipolar type F25.0 ; Impulse 
disorder, unspecified F63.9 ; Attention deficit hyperactivity disorder (ADHD), 
predominantly inattentive type F90.0 ; Anxiety F41.9 and Other long term (
current) drug therapy Z79.899

 

 [facility]  [id number] [place] [address] 29348-
4087  11 Aug, 2017   

 

 [facility]  [id number] [place] [address] 13195-
4196    Hypercholesterolemia with hyperglyceridemia E78.2

 

 [facility]  [id number] [place] [address] 34247-
2838    Schizoaffective disorder, bipolar type F25.0 and Impulse 
disorder, unspecified F63.9

 

 [facility]  [address] [place] [address] 99348-
7123    Hypercholesterolemia with hyperglyceridemia E78.2 ; Impulse 
disorder, unspecified F63.9 ; Bipolar disorder, current episode mixed, 
unspecified F31.60 ; Schizoaffective disorder, bipolar type F25.0 and ADD (
attention deficit disorder) F90.0

 

 [facility]  [address], [place] [address]-
8580  17 Mar, 2017   

 

 [facility]  [id number] [place] [address] 01421-
7462  13 Mar, 2017   

 

 [facility]  [address] [address]-
0562  08 Mar, 2017  Schizoaffective disorder, bipolar type F25.0

 

 [facility]  [address] [place] [address] 80253-
0389  06 Mar, 2017   

 

 [facility]  [address] [place] [address] 37510-
9009  06 Mar, 2017   

 

 [facility]  [address], [place] [address]-
4600  02 Mar, 2017  Schizoaffective disorder, bipolar type F25.0

 

 [facility]  [id number] [place] [address] 36764-
0535  02 Mar, 2017  Schizoaffective disorder, bipolar type F25.0

 

 [facility]  [address] [place] [address] 39204-
9512  10 Nov, 2016   

 

 [facility]  [id number] [place] [address] 57559-
6801     

 

 [facility]  [id number] [place] [address] 50805-
1317    Schizoaffective disorder, bipolar type F25.0 and Other long 
term (current) drug therapy Z79.899

 

 [facility]  [id number] [place] [address] 86191-
9549    ADD (attention deficit disorder) F90.0

 

 [facility]  [id number] [place] [address] 01583-
5367    ADD (attention deficit disorder) F90.0

 

 [facility]  [id number] [place] [address] 94799-
8997    Schizoaffective disorder, bipolar type F25.0 and ADD (
attention deficit disorder) F90.0

 

 [facility]  [address], [place] [address]-
5536     

 

 [facility]  [address] [address]-
7371  27 Oct, 2015   

 

 [facility]  [id number] [place] [address] 57016-
1493  09 Oct, 2015  Encounter for long-term (current) use of other medications 
Z79.899

 

 [facility]  [address] [address]-
8501  07 Oct, 2015  Schizoaffective disorder, bipolar type F25.0 ; Anxiety 
disorder, unspecified F41.9 ; Impulse disorder, unspecified F63.9 and Encounter 
for long-term (current) use of other medications Z79.899

 

 [facility]  [id number] [place] [address] 44677-
7314  26 Aug, 2015   

 

 [facility]  [address], [place] [address]-
6473  26 Aug, 2015   

 

 [facility]  [id number] [place] [address] 07441-
7991     

 

 [facility]  [id number] [place] [address] 39635-
1908     

 

 [facility]  [id number] [place] [address] 50565-
9315  12 May, 2015   

 

 [facility]  [id number] [place] [address] 72137-
8304     

 

 [facility]  [id number] [place] [address] 93036-
4102     

 

 [facility]  [address] [address]-
9839  04 Mar, 2015   

 

 [facility]  [address] [place] [address] 91663-
1680  04 Mar, 2015   

 

 [facility]  [address], [place] [address]-
1727     

 

 [facility]  [address] [address]-
2072     

 

 [facility]  [address] [address]-
8147     

 

 [facility]  [address], [place] [address]-
0066     

 

 [facility]  [address] [address]-
2603     

 

 [facility]  [address] [place] [address]-
9846     

 

 [facility]  [address] [id number] [place] [address]-
0257     

 

 [facility] [place] [facility]  [address] [id number] [place] [address]
2546     

 

 [facility]  [address] [id number] [place] [address]-
4342     

 

 [facility] [place] [facility]  [address] [id number] [place] [address]-
2753     

 

 [facility]  [address] [id number] [place] [address]-
2871  22 Dec, 2014   

 

 [facility]  [address] [id number] [place] [address]-
7374  22 Dec, 2014   

 

 [facility] [place] [facility]  [address] [id number] [place] [address]-
2256  02 Dec, 2014   

 

 [facility]  [address] [id number] [place] [address]-
3306  02 Dec, 2014   

 

 [facility] [place] [facility]  [address] [id number] [place] [address]-
4188  01 Dec, 2014   

 

 [facility]  [address] [id number] [place] [address]-
3656  01 Dec, 2014   

 

 [facility] [place] [facility]  [address] [id number] [place] [address]-
6213     

 

 [facility] [place] [facility]  [address] [id number] [place] [address]-
5707     

 

 [facility] [place] [facility]  [address] [id number] [place] [address]-
0791     

 

 [facility] [place] [facility]  [address] [id number] [place] [address]-
2546     

 

 [facility] [place] [facility]  [address] [id number] [place] [address]-
1002  30 Oct, 2014   

 

 [facility] [place] [facility]  [address] [id number] [place] [address]-
1477  30 Oct, 2014   

 

 [facility] [place] [facility]  [address] [id number] [place] [address]-
3070  27 Oct, 2014   

 

 [facility] [place] [facility]  [address] [id number] [place]
8519  27 Oct, 2014   

 

 [facility] [place] [facility]  [address] [id number] [place] [address]-
2859  06 Oct, 2014   

 

 [facility] [place] [facility]  [address] [id number] [place]-
2260  06 Oct, 2014   

 

 [facility] [place] [facility]  [address] [id number] [place] [address]-
8488  03 Oct, 2014   

 

 [facility] [place] [facility]  [address] [id number] [place] [address]-
4722  03 Oct, 2014   

 

 [facility] [place] [facility]  [address] [id number] [place] [address]-
2642  05 Sep,    

 

 [facility] [place] [facility]  [address] [id number] [place] [address]-
9191  05 Sep,    

 

 [facility] [place] [facility]  [address] [id number] [place] [address]-
7434  05 Sep,    

 

 [facility] [place] [facility]  [address] [id number] [place] [address]-
9185  05 Sep, 2014   

 

 [facility] [place] [facility]  [address] [id number] [address]-
0453  04 Aug, 2014   

 

 [facility] [place] [facility]  [address] [id number] [place] [address]-
3007  04 Aug, 2014   

 

 [facility] [place] [facility]  [address] [id number] [place] [address]-
6696     

 

 [facility] [place] [facility]  [address] [id number] [place] [address]-
2797     

 

 [facility] [place] [facility]  [address] [id number] [place] [address]-
0995     

 

 [facility] [place] [facility]  [address] [id number] [place] [address]-
8174     

 

 [facility] [place] [facility]  [address] [id number] [place] [address]-
8379     

 

 [facility] [place] [facility]  [address] [id number] [place] [address]-
0277     

 

 [facility] [place] [facility]  [address] [id number] [place] [address]-
8881     

 

 [facility] [place] [facility]  [address] [id number] [place] [address]-
0291     

 

 [facility] [place] [facility]  [address] [id number] [place] [address]-
4972     

 

 [facility] [place] [facility]  [address] [id number] [place] [address]-
5156     

 

 [facility] [place] [facility]  [address] [id number] [place] [address]-
3229  09 May, 2014   

 

 [facility] [place] [facility]  [address] [id number] [place] [address]-
3423  09 May, 2014   

 

 [facility] [place] [facility]  [address] [id number] [place] [address]-
9051     

 

 [facility] [place] [facility]  [address] [id number] [place] [address]-
8300     

 

 [facility] [place] [facility]  [address] [id number] [place] [address]-
5393     

 

 [facility] [place] [facility]  [address] [id number] [place] [address]-
4010     

 

 [facility] [place] [facility]  [address] [id number] [place] [address]-
1132     

 

 [facility] [place] [facility]  [address] [id number] [place] [address]-
5866  06 Mar, 2014   

 

 [facility] [place] [facility]  [address] [id number] [place] [address]-
9987  06 Mar, 2014   

 

 [facility] [place] [facility]  [address] [id number] [place] [address]-
3864     

 

 [facility] [place] [facility]  [address] [id number] [place] [address]-
1940     

 

 [facility] [place] [facility]  [address] [id number] [place] [address]-
8838  10 Chidi, 2014   

 

 [facility] [place] [facility]  [address] [id number] [place] [address]-
5635  10 Chidi, 2014   

 

 [facility] [place] [facility]  [address] [id number] [place] [address]-
5049  23 Dec, 2013   

 

 [facility] [place] [facility]  [address] [id number] [place] [address]-
0824  23 Dec, 2013   

 

 [facility] [place] [facility]  [address] [id number] [place] [address]-
4614  20 Dec, 2013   

 

 [facility] [place] [facility]  [address] [id number] [place] [address]-
4295  20 Dec, 2013   

 

 [facility] [place] [facility]  [address] [id number] [place] [address]-
4265  19 Dec, 2013   

 

 [facility] [place] [facility]  [address] [id number] [place] [address]-
4807  05 Dec, 2013   

 

 [facility] [place] [facility]  [address] [id number] [place] [address]-
5161  05 Dec, 2013   

 

 [facility] [place] [facility]  [address] [id number] [place] [address]-
6498  03 Dec, 2013   

 

 [facility] [place] [facility]  [address] [id number] [place] [address]-
8487  03 Dec, 2013   

 

 [facility] [place] [facility]  [address] [id number] [place] [address]-
3116  18 Sep, 2013   

 

 [facility] [place] [facility]  [address] [id number] [address]-
7366     

 

 [facility]  [address] [id number] [place] [address]-
1029     

 

 [facility] [place] [facility]  [address] [id number] [place] [address]-
4546     

 

 [facility]  [address] [id number] [place] [address]-
0996  09 May, 2013   

 

 [facility] [place] [facility]  [address] [id number] [place] [address]-
2844     

 

 [facility]  [address] [id number] [place] [address]-
7337  18 Mar, 2013   

 

 [facility] [place] [facility]  [address] [id number] [place] [address]-
2836  07 Mar, 2013   

 

 [facility]  [address] [id number] [place] [address]-
9518  05 Mar, 2013   

 

 [facility] [place] [facility]  [address] [id number] [place] [address]-
6951     

 

 [facility]  [address] [id number] [place] [address]-
0427     

 

 [facility]  [address] [id number] [place] [address]-
8566     

 

 [facility]  [address] [id number], [place] [address]-
6900     

 

 [facility] [place] [facility]  [address] [id number], [place] [address]-
0320  18 Dec, 2012   

 

 [facility] [place] [facility]  [address] [id number] [place] [address]-
3746  18 Dec, 2012   

 

 [facility] [place] [facility]  [address] [id number] [place] [address]-
4746  06 Dec, 2012   

 

 [facility] [place] [facility]  [address] [id number] [place] [address]-
3356  04 Dec, 2012   

 

 [facility] [place] [facility]  [address] [id number] [place] [address]-
2546  04 Dec, 2012   

 

 [facility] [place] [facility]  [address] [id number] [place] [address]-
7017     

 

 [facility] [place] [facility]  [address] [id number] [place] [address]
2546     

 

 [facility] [place] [facility]  [address] [id number] [place] [address]-
7415  31 Oct, 2012   

 

 [facility] [place] [facility]  [address] [id number] [place] [address]-
2519  31 Oct, 2012   

 

 [facility] [place] [facility]  [address] [id number] [place] [address]-
8106  26 Oct, 2012   

 

 [facility] [place] [facility]  [address] [id number] [place] [address]-
8098  26 Oct, 2012   

 

 [facility] [place] [facility]  [address] [id number] [place] [address]-
6938  23 Oct, 2012   

 

 [facility] [place] [facility]  [address] [id number] [place] [address]-
9834  23 Oct, 2012   

 

 [facility] [place] [facility]  [address] [id number] [place] [address]-
9986  18 Sep, 2012   

 

 [facility] [place] [facility]  [address] [id number] [place] [address]-
0032  23 Aug, 2012   

 

 [facility] [place] [facility]  [address] [id number] [place] [address]-
1923  22 Aug, 2012   

 

 [facility] [place] [facility]  [address] [id number] [place] [address]-
0682     

 

 [facility] [place] [facility]  [address] [id number] [place] [address]-
2546     

 

 [facility] [place] [facility]  [address] [id number] [place] [address]-
2546     

 

 [facility] [place] [facility]  [address] [id number] [place] [address]
2545     

 

 [facility] [place] [facility]  [address] [id number] [place] [address]-
8059  01 May, 2012   

 

 [facility] [place] [facility]  [address] [id number] [place] [address]-
9306     

 

 [facility] [place] [facility]  [address] [id number] [place] [address]-
4286  10 Apr, 2012   

 

 [facility] [place] [facility]  [address] [id number] [place] [address]-
3459  14 Mar, 2012   

 

 [facility] [place] [facility]  [address] [id number] [place] [address]-
6954     

 

 [facility] [place] [facility]  [address] [id number] [place] [address]-
2297     

 

 [facility] [place] [facility]  [address] [id number] [place] [address]-
5805  26 Dec, 2011   

 

 [facility] [place] [facility]  [address] [id number] [place] [address]-
1213  21 Dec, 2011   

 

 [facility] [place] [facility]  [address] [id number] [place] [address]-
2375  19 Dec, 2011   

 

 [facility] [place] [facility]  [address] [id number] [place] [address]-
6623  16 Dec, 2011   

 

 [facility] [place] [facility]  [address] [id number] [place] [address]-
8133  16 Dec, 2011   

 

 [facility] [place] [facility]  [address] [id number] [place] [address]-
8938  14 Dec, 2011   

 

 [facility] [place] [facility]  [address] [id number] [place] [address]-
1961  14 Dec, 2011   

 

 [facility] [place] [facility]  [address] [id number] [place] [address]-
6336  17 2011   

 

 [facility] [place] [facility]  [address] [id number] [place] [address]-
6656  17 2011   

 

 [facility] [place] [facility]  [address] [id number], [place] [address]-
3211  18 Oct, 2011   

 

 [facility]  [address] [id number] [id number]-
5588  10 Dec, 2010   

 

 [facility]  [address] [id number], [place] [address]
7910     

 

 [facility]  [address] [id number], [place] [address]-
7952     

 

 [facility]  [address] [id number], [place] [address]-
7061  26 Oct, 2010   







IMMUNIZATIONS

No Known Immunizations



SOCIAL HISTORY

Never Assessed



REASON FOR VISIT

Waiting for call back



PLAN OF CARE





VITAL SIGNS





MEDICATIONS

Unknown Medications



RESULTS

No Results



PROCEDURES

No Known procedures



INSTRUCTIONS





MEDICATIONS ADMINISTERED

No Known Medications



MEDICAL (GENERAL) HISTORY







 Type  Description  Date

 

 Medical History  depression   

 

 Medical History  adhd   

 

 Medical History  bipolar   

 

 Medical History  hyperlipidemia   

 

 Medical History  Hx of heart valve dysfunction but says this has resolved   

 

 Medical History  lactose intolerant   

 

 Hospitalization History  [facility] x4, last incident at age 9

## 2018-10-30 NOTE — XMS REPORT
[facility]

 Created on: 11/10/2017



Oliver Owens

External Reference #: [id number]

: 1995

Sex: Male



Demographics







 Address  [address] [place]  [address]

 

 Preferred Language  Unknown

 

 Marital Status  Unknown

 

 Faith Affiliation  Unknown

 

 Race  Unknown

 

 Ethnic Group  Unknown





Author







 Author  ANUSHA  DANITA

 

 [facility]

 

 Address  [address]  [place]  [address]



 

 Phone  [phone]







Care Team Providers







 Care Team Member Name  Role  Phone

 

 DANITA TAVARES  Unavailable  [phone]







PROBLEMS







 Type  Condition  ICD9-CM Code  TAI60-YV Code  Onset Dates  Condition Status  
SNOMED Code

 

 Problem  Schizoaffective disorder, bipolar type     F25.0     Active  23866816

 

 Problem  ADD (attention deficit disorder)     F90.0     Active  153790174

 

 Problem  Social anxiety disorder     F40.10     Active  78695465

 

 Problem  Attention deficit hyperactivity disorder (ADHD), predominantly 
inattentive type     F90.0     Active  43974602

 

 Problem  Impulse disorder, unspecified     F63.9     Active  85693964

 

 Problem  Hypercholesterolemia with hyperglyceridemia     E78.2     Active  
[id number]

 

 Problem  Anxiety     F41.9     Active  50272710

 

 Problem  Bipolar disorder, current episode mixed, unspecified     F31.60     
Active  46645698







ALLERGIES

No Information



SOCIAL HISTORY

Never Assessed



PLAN OF CARE





VITAL SIGNS





MEDICATIONS

Unknown Medications



RESULTS







 Name  Result  Date  Reference Range

 

 CBC     2017   

 

 WBC  5.3     3.4-10.8

 

 RBC  5.17     4.14-5.80

 

 Hemoglobin  15.0     12.6-17.7

 

 Hematocrit  44.9     37.5-51.0

 

 MCV  87     79-97

 

 MCH  29.0     26.6-33.0

 

 MCHC  33.4     31.5-35.7

 

 RDW  13.2     12.3-15.4

 

 Platelets  245     150-379

 

 Neutrophils  62      

 

 Lymphs  27      

 

 Monocytes  9      

 

 Eos  2      

 

 Basos  0      

 

 Immature Cells         

 

 Neutrophils (Absolute)  3.3     1.4-7.0

 

 Lymphs (Absolute)  1.4     0.7-3.1

 

 Monocytes(Absolute)  0.5     0.1-0.9

 

 Eos (Absolute)  0.1     0.0-0.4

 

 Baso (Absolute)  0.0     0.0-0.2

 

 Immature Granulocytes  0      

 

 Immature Grans (Abs)  0.0     0.0-0.1

 

 NRBC         

 

 Hematology Comments:         

 

 LIPID PANEL     2017   

 

 Cholesterol, Total  194     100-199

 

 Triglycerides  154     0-149

 

 HDL Cholesterol  39     >39

 

 VLDL Cholesterol Santhosh  31     5-40

 

 LDL Cholesterol Calc  124     0-99

 

 CMP     2017   

 

 Glucose, Serum  89     65-99

 

 BUN  16     6-20

 

 Creatinine, Serum  0.86     0.76-1.27

 

 eGFR If NonAfricn Am  122         >59

 

 eGFR If Africn Am  141         >59

 

 BUN/Creatinine Ratio  19     9-20

 

 Sodium, Serum  141     134-144

 

 Potassium, Serum  4.7     3.5-5.2

 

 Chloride, Serum  103     

 

 Carbon Dioxide, Total  24     18-29

 

 Calcium, Serum  9.7     8.7-10.2

 

 Protein, Total, Serum  6.9     6.0-8.5

 

 Albumin, Serum  4.6     3.5-5.5

 

 Globulin, Total  2.3     1.5-4.5

 

 A/G Ratio  2.0     1.2-2.2

 

 Bilirubin, Total  0.3     0.0-1.2

 

 Alkaline Phosphatase, S  85     

 

 AST (SGOT)  22     0-40

 

 ALT (SGPT)  21     0-44







PROCEDURES







 Procedure  Date Ordered  Result  Body Site

 

 COMPLETE CBC W/AUTO DIFF WBC  2017      

 

 LIPID PANEL  2017      

 

 VENIPUNCT, ROUTINE*  2017      

 

 COMPREHEN METABOLIC PANEL  2017      







IMMUNIZATIONS

No Known Immunizations



MEDICAL (GENERAL) HISTORY







 Type  Description  Date

 

 Medical History  depression   

 

 Medical History  adhd   

 

 Medical History  bipolar   

 

 Medical History  hyperlipidemia   

 

 Medical History  Hx of heart valve dysfunction but says this has resolved   

 

 Medical History  lactose intolerant   

 

 Hospitalization History  [facility] x4, last incident at age 9

## 2018-10-30 NOTE — XMS REPORT
[facility]

 Created on: 2018



Oliver Owens

External Reference #: [id number]

: 1995

Sex: Male



Demographics







 Address  [address] [place]  [address]

 

 Preferred Language  Unknown

 

 Marital Status  Unknown

 

 Yazdanism Affiliation  Unknown

 

 Race  Unknown

 

 Ethnic Group  Unknown





Author







 Author  RHONA  PATTY

 

 [facility]

 

 Address  [address] [place]  [address]



 

 Phone  [phone]







Care Team Providers







 Care Team Member Name  Role  Phone

 

 RHONA,  PATTY  Unavailable  [phone]







PROBLEMS







 Type  Condition  ICD9-CM Code  GPL78-SX Code  Onset Dates  Condition Status  
SNOMED Code

 

 Problem  ADD (attention deficit disorder)     F90.0     Active  807048087

 

 Problem  Hypercholesterolemia with hyperglyceridemia     E78.2     Active  
627164222

 

 Problem  Schizoaffective disorder, bipolar type     F25.0     Active  33191135

 

 Problem  Gastroesophageal reflux disease, esophagitis presence not specified  
   K21.9     Active  748754670

 

 Problem  Social anxiety disorder     F40.10     Active  12284019

 

 Problem  Bipolar disorder, current episode mixed, unspecified     F31.60     
Active  48913865

 

 Problem  Impulse disorder, unspecified     F63.9     Active  40251264

 

 Problem  Attention deficit hyperactivity disorder (ADHD), predominantly 
inattentive type     F90.0     Active  56494437

 

 Problem  Anxiety     F41.9     Active  73384399







ALLERGIES







 Substance  Reaction  Event Type  Date  Status

 

 Lactose  Unknown  Drug Allergy    Active

 

 Cipro  hives  Drug Allergy    Active







ENCOUNTERS







 Encounter  Location  Date  Diagnosis

 

 [place] [id number] [place] [address] 90897-
6894    Gastroesophageal reflux disease, esophagitis presence not 
specified K21.9

 

 [facility]  [id number] [place] [address] 17090-
0506    Schizoaffective disorder, bipolar type F25.0

 

 [facility]  [id number] [place] [address] 25285-
7630    Schizoaffective disorder, bipolar type F25.0 ; Impulse 
disorder, unspecified F63.9 ; Attention deficit hyperactivity disorder (ADHD), 
inattentive type, mild F90.0 and Social anxiety disorder F40.10

 

 [facility]  [id number] [address]
6922    Schizoaffective disorder, bipolar type F25.0 ; Impulse 
disorder, unspecified F63.9 ; Attention deficit hyperactivity disorder (ADHD), 
inattentive type, mild F90.0 and Social anxiety disorder F40.10

 

 [facility]  [address] [place] [address] 40063-
5106  13 Dec, 2017  Schizoaffective disorder, bipolar type F25.0 ; Impulse 
disorder, unspecified F63.9 ; Attention deficit hyperactivity disorder (ADHD), 
inattentive type, mild F90.0 and Social anxiety disorder F40.10

 

 [place] [id number] [place] [address] 55543-
7023  11 Dec, 2017   

 

 [place] [id number] [place] [address] 73335-
5443    Schizoaffective disorder, bipolar type F25.0

 

 [place] [id number] [place] [address] 42479-
5489    Schizoaffective disorder, bipolar type F25.0 ; Impulse 
disorder, unspecified F63.9 ; Attention deficit hyperactivity disorder (ADHD), 
inattentive type, mild F90.0 and Social anxiety disorder F40.10

 

 [place] [address] [address]-
6760  12 Oct, 2017  Schizoaffective disorder, bipolar type F25.0 ; Impulse 
disorder, unspecified F63.9 ; Attention deficit hyperactivity disorder (ADHD), 
inattentive type, mild F90.0 and Social anxiety disorder F40.10

 

 [place] [address] [address]-
1715  22 Sep, 2017   

 

 [facility]  [id number] [place] [address] 94771-
8947  08 Sep, 2017  Schizoaffective disorder, bipolar type F25.0

 

 [facility]  [address] [place] [address] 86199-
8381  07 Sep, 2017  Other long term (current) drug therapy Z79.899

 

 [facility]  [address] [place] [address] 60673-
2660  01 Sep, 2017  Schizoaffective disorder, bipolar type F25.0 ; Impulse 
disorder, unspecified F63.9 ; Attention deficit hyperactivity disorder (ADHD), 
predominantly inattentive type F90.0 ; Anxiety F41.9 and Other long term (
current) drug therapy Z79.899

 

 [facility]  [id number] [place] [address] 12374-
1804  11 Aug, 2017   

 

 [facility]  [id number] [place] [address] 43829-
3614    Hypercholesterolemia with hyperglyceridemia E78.2

 

 [facility]  [id number] [place] [address] 40044-
2767    Schizoaffective disorder, bipolar type F25.0 and Impulse 
disorder, unspecified F63.9

 

 [facility]  [id number] [place] [address] 26447-
3334    Hypercholesterolemia with hyperglyceridemia E78.2 ; Impulse 
disorder, unspecified F63.9 ; Bipolar disorder, current episode mixed, 
unspecified F31.60 ; Schizoaffective disorder, bipolar type F25.0 and ADD (
attention deficit disorder) F90.0

 

 [facility]  [address] [address]-
9989  17 Mar, 2017   

 

 [facility]  [id number] [place] [address] 60708-
5049  13 Mar, 2017   

 

 [facility]  [id number] [place] [address] 99001-
8312  08 Mar, 2017  Schizoaffective disorder, bipolar type F25.0

 

 [facility]  [id number] [place] [address] 92606-
5734  06 Mar, 2017   

 

 [facility]  [id number] [place] [address] 78564-
4964  06 Mar, 2017   

 

 [facility]  [id number] [address]-
5280  02 Mar, 2017  Schizoaffective disorder, bipolar type F25.0

 

 [facility]  [id number] [place] [address] 63013-
3104  02 Mar, 2017  Schizoaffective disorder, bipolar type F25.0

 

 [facility]  [address] [address]-
0683  10 Nov, 2016   

 

 [facility]  [id number] [place] [address] 39084-
2424     

 

 [facility]  [address] [place] [address] 32841-
1533    Schizoaffective disorder, bipolar type F25.0 and Other long 
term (current) drug therapy Z79.899

 

 [facility]  [id number] [place] [address] 93811-
2279    ADD (attention deficit disorder) F90.0

 

 [facility]  [id number] [place] [address] 61212-
7927    ADD (attention deficit disorder) F90.0

 

 [facility]  [id number] [place] [address] 61545-
4238    Schizoaffective disorder, bipolar type F25.0 and ADD (
attention deficit disorder) F90.0

 

 [facility]  [address] [address]-
2422     

 

 [facility]  [address] [address]-
9620  27 Oct, 2015   

 

 [facility]  [id number] [place] [address] 75227-
9805  09 Oct, 2015  Encounter for long-term (current) use of other medications 
Z79.899

 

 [facility]  [address] [address]-
5642  07 Oct, 2015  Schizoaffective disorder, bipolar type F25.0 ; Anxiety 
disorder, unspecified F41.9 ; Impulse disorder, unspecified F63.9 and Encounter 
for long-term (current) use of other medications Z79.899

 

 [facility]  [id number] [place] [address] 88401-
7983  26 Aug, 2015   

 

 [facility]  [id number] [place] [address] 21346-
6017  26 Aug, 2015   

 

 [facility]  [id number] [place] [address] 55196-
2644     

 

 [facility]  [id number] [place] [address] 28506-
9915     

 

 [facility]  [id number] [place] [address] 88341-
1675  12 May, 2015   

 

 [facility]  [id number] [place] [address] 49588-
4935     

 

 [facility]  [id number] [place] [address] 20780-
9552     

 

 [facility]  [id number] [place] [address] 39071-
3529  04 Mar, 2015   

 

 [facility]  [address] [place] [address] 23078-
7936  04 Mar, 2015   

 

 [facility]  [address] [place] [address] 74261-
5774     

 

 [facility]  [address] [place] [address] 50462-
7897     

 

 [facility]  [address], [place] [address]-
6011     

 

 [facility]  [id number] [address]-
6374     

 

 [facility]  [address] [address]-
1996     

 

 [facility]  [id number] [place] [address] 99484-
8526     

 

 [facility]  [address] [id number] [place] [address]-
5751     

 

 [facility] [place] [facility]  [address] [id number] [place] [address]-
7218     

 

 [facility] [place] [facility]  [address] [facility] [id number] [place] [address]-
9811     

 

 [facility] [place] [facility]  [address] [id number] [place] [address]-
8349     

 

 [facility] [place] [facility]  [address] [id number] [place]
9242  22 Dec, 2014   

 

 [facility] [place] [facility]  [address] [id number] [place] [address]-
9569  22 Dec, 2014   

 

 [facility] [place] [facility]  [address] [facility] [id number] [place] [address]-
6498  02 Dec, 2014   

 

 [facility] [place] [facility]  [address] [facility] [id number] [place] [address]-
4605  02 Dec, 2014   

 

 [facility] [place] [facility]  [id number] [place] [address]-
3246  01 Dec, 2014   

 

 [facility] [place] [facility]  [address] [facility] [id number] [place] [address]-
4551  01 Dec, 2014   

 

 [facility] [place] [facility]  [address] [id number] [place] [address]-
1772     

 

 [facility] [place] [facility]  [address] [id number] [address]-
3390     

 

 [facility] [place] [facility]  [address] [id number] [place] [address]-
3563     

 

 [facility] [place] [facility]  [address] [facility] [id number] [place] [address]-
0244     

 

 [facility] [place] [facility]  [address] [facility] [id number] [place] [address]-
0372  30 Oct, 2014   

 

 [facility] [place] [facility]  [address] [id number] [place] [address]-
4081  30 Oct, 2014   

 

 [facility] [place] [facility]  [address] [id number] [place]
8482  27 Oct, 2014   

 

 [facility] [place] [facility]  [address] [id number] [place]
5930  27 Oct,    

 

 [facility] [place] [facility]  [address] [id number] [place] [address]-
7317  06 Oct, 2014   

 

 [facility] [place] [facility]  [address] [id number] [place] [address]-
5004  06 Oct, 2014   

 

 [facility] [place] [facility]  [address] [id number] [place] [address]-
4234  03 Oct, 2014   

 

 [facility] [place] [facility]  [address] [id number] [place]
9862  03 Oct, 2014   

 

 [facility] [place] [facility]  [address] [id number] [place] [address]-
8443  05 Sep,    

 

 [facility] [place] [facility]  [address] [id number] [place] [address]-
4051  05 Sep,    

 

 [facility] [place] [facility]  [address] [id number] [place] [address]-
4964  05 Sep,    

 

 [facility] [place] [facility]  [address] [id number] [place] [address]-
2627  05 Sep,    

 

 [facility] [place] [facility]  [address] [id number] [place] [address]-
0379  04 Aug, 2014   

 

 [facility] [place] [facility]  [address] [id number] [place] [address]-
2314  04 Aug, 2014   

 

 [facility] [place] [facility]  [address] [id number] [place] [address]-
5833     

 

 [facility] [place] [facility]  [address] [id number] [place] [address]-
2872     

 

 [facility] [place] [facility]  [address] [id number] [place] [address]-
2031     

 

 [facility] [place] [facility]  [address] [id number] [place] [address]-
8315     

 

 [facility] [place] [facility]  [address] [id number] [place] [address]-
1185     

 

 [facility] [place] [facility]  [address] [id number] [place] [address]-
6532     

 

 [facility] [place] [facility]  [address] [id number] [place] [address]-
8683     

 

 [facility] [place] [facility]  [address] [id number] [place] [address]-
0101     

 

 [facility] [place] [facility]  [address] [id number] [place] [address]-
1918     

 

 [facility] [place] [facility]  [address] [id number] [place] [address]-
9993     

 

 [facility] [place] [facility]  [address] [id number] [place] [address]-
2097  09 May, 2014   

 

 [facility] [place] [facility]  [address] [id number] [place] [address]-
9117  09 May, 2014   

 

 [facility] [place] [facility]  [address] [id number] [place] [address]-
3321     

 

 [facility] [place] [facility]  [address] [id number] [address]-
3796     

 

 [facility] [place] [facility]  [address] [id number] [address]-
6295     

 

 [facility] [place] [facility]  [address] [id number] [place] [address]-
7002     

 

 [facility] [place] [facility]  [address] [id number] [place] [address]-
2120     

 

 [facility] [place] [facility]  [address] [id number] [place] [address]-
3618  06 Mar, 2014   

 

 [facility] [place] [facility]  [address] [id number] [place] [address]-
9949  06 Mar, 2014   

 

 [facility]  [address] [id number] [place] [address]-
6286     

 

 [facility]  [address] [id number] [place] [address]-
4556     

 

 [facility]  [address] [id number] [place] [address]-
9725  10 Chidi, 2014   

 

 [facility]  [address] [id number] [place] [address]-
5895  10 Chidi, 2014   

 

 [facility]  [address] [id number] [place] [address]-
3170  23 Dec, 2013   

 

 [facility]  [address] [id number] [place] [address]-
5557  23 Dec, 2013   

 

 [facility]  [address] [id number] [place] [address]-
8652  20 Dec, 2013   

 

 [facility]  [address] [id number] [place] [address]-
0867  20 Dec, 2013   

 

 [facility]  [address] [facility] [id number] [place] [address]-
7112  19 Dec, 2013   

 

 [facility]  [address] [facility] [id number] [place] [address]-
7955  05 Dec, 2013   

 

 [facility]  [address] [id number] [place] [address]-
8067  05 Dec, 2013   

 

 [facility]  [address] [id number] [place] [address]-
4322  03 Dec, 2013   

 

 [facility] [place] [facility]  [address] [id number] [place] [address]-
1485  03 Dec, 2013   

 

 [facility]  [address] [id number] [place] [address]-
2546  18 Sep, 2013   

 

 [facility]  [address] [id number] [place] [address]-
4014     

 

 [facility]  [address] [id number] [place] [address]-
9726     

 

 [facility] [place] [facility]  [address] [id number] [place] [address]-
9836     

 

 [facility] [place] [facility]  [address] [id number] [place] [address]-
4716  09 May, 2013   

 

 [facility] [place] [facility]  [address] [id number] [place] [address]-
2546     

 

 [facility]  [address] [id number] [place] [address]-
0886  18 Mar, 2013   

 

 [facility] [place] [facility]  [address] [id number] [place] [address]-
2836  07 Mar, 2013   

 

 [facility]  [address] [id number] [place] [address]-
2546  05 Mar, 2013   

 

 [facility]  [address] [id number] [place] [address]-
3036     

 

 [facility] [place] [facility]  [address] [id number] [place] [address]-
8816     

 

 [facility]  [address] [facility] [id number] [place] [address]-
2976     

 

 [facility] [place] [facility]  [address] [id number] [place] [address]-
2546     

 

 [facility] [place] [facility]  [address] [id number] [address]-
6806  18 Dec, 2012   

 

 [facility] [place] [facility]  [address] [id number] [place] [address]-
4106  18 Dec, 2012   

 

 [facility] [place] [facility]  [address] [id number] [place] [address]-
9316  06 Dec, 2012   

 

 [facility] [place] [facility]  [address] [id number] [place] [address]-
2546  04 Dec, 2012   

 

 [facility] [place] [facility]  [address] [id number] [place] [address]-
2564  04 Dec, 2012   

 

 [facility] [place] [facility]  [address] [id number] [place] [address]-
6818     

 

 [facility] [place] [facility]  [address] [id number] [place] [address]-
8459     

 

 [facility] [place] [facility]  [address] [id number] [place] [address]-
9573  31 Oct, 2012   

 

 [facility] [place] [facility]  [address] [id number] [place] [address]-
7643  31 Oct, 2012   

 

 [facility] [place] [facility]  [address] [id number] [place] [address]-
6170  26 Oct, 2012   

 

 [facility] [place] [facility]  [address] [id number] [place] [address]-
5373  26 Oct, 2012   

 

 [facility] [place] [facility]  [address] [id number] [place] [address]-
2005  23 Oct, 2012   

 

 [facility] [place] [facility]  [address] [id number] [place] [address]-
4310  23 Oct, 2012   

 

 [facility] [place] [facility]  [address] [id number] [place] [address]-
2428  18 Sep, 2012   

 

 [facility] [place] [facility]  [address] [id number] [place] [address]-
0012  23 Aug, 2012   

 

 [facility] [place] [facility]  [address] [id number] [place] [address]-
7517  22 Aug, 2012   

 

 [facility] [place] [facility]  [address] [id number] [place] [address]-
4820     

 

 [facility] [place] [facility]  [address] [id number] [place] [address]-
3885     

 

 [facility] [place] [facility]  [address] [id number] [place] [address]-
1978     

 

 [facility] [place] [facility]  [address] [facility] [id number] [place] [address]-
3476     

 

 [facility] [place] [facility]  [address] [id number] [place] [address]-
3120  01 May, 2012   

 

 [facility] [place] [facility]  [address] [id number] [place] [address]-
3808  27 2012   

 

 [facility] [place] [facility]  [address] [id number] [place] [address]-
6756  10 Apr, 2012   

 

 [facility] [place] [facility]  [address] [id number] [place] [address]-
1641  14 Mar, 2012   

 

 [facility] [place] [facility]  [address] [id number] [place] [address]-
2574  31 2012   

 

 [facility] [place] [facility]  [address] [id number] [place] [address]-
2171     

 

 [facility] [place] [facility]  [address] [id number] [place] [address]-
0405  26 Dec, 2011   

 

 [facility] [place] [facility]  [address] [id number] [place] [address]-
2320  21 Dec, 2011   

 

 [facility] [place] [facility]  [address] [id number] [place] [address]-
7426  19 Dec, 2011   

 

 [facility] [place] [facility]  [address] [id number] [place] [address]-
6022  16 Dec, 2011   

 

 [facility] [place] [facility]  [address] [id number] [place]
3503  16 Dec, 2011   

 

 [facility] [place] [facility]  [address] [id number] [place] [address]-
7193  14 Dec, 2011   

 

 [facility] [place] [facility]  [address] [id number] [place] [address]-
8507  14 Dec, 2011   

 

 [facility] [place] [facility]  [address] [id number] [place] [address]-
1153  17 2011   

 

 [facility] [place] [facility]  [address] [id number] [place] [address]-
9376  17 2011   

 

 [facility] [place] [facility]  [address] [id number] [place] [address]-
3345  18 Oct, 2011   

 

 [facility]  [address] [id number] [place] [address]-
0486  10 Dec, 2010   

 

 [facility]  [address] [facility] [address] [facility] [id number] [address]-
4436     

 

 [facility]  [address] [facility] [address] [id number] [address]-
2916     

 

 [facility]  [address] [facility] [address] [facility] [id number], [place] [address]-
9870  26 Oct, 2010   







IMMUNIZATIONS

No Known Immunizations



SOCIAL HISTORY

Never Assessed



REASON FOR VISIT

 f/u wb-ma



PLAN OF CARE







 Activity  Details









  









 Follow Up  3 Months Reason: f/u







VITAL SIGNS







 Height  65 in  2018

 

 Weight  205 lbs  2018

 

 Heart Rate  74 bpm  2018

 

 Respiratory Rate  18   2018

 

 BMI  34.11 kg/m2  2018

 

 Blood pressure systolic  118 mmHg  2018

 

 Blood pressure diastolic  78 mmHg  2018







MEDICATIONS







 Medication  Instructions  Dosage  Frequency  Start Date  End Date  Duration  
Status

 

 Lithium Carbonate 300 MG  Orally twice a day  2 capsules  12h           Active

 

 Latuda 40 mg  Orally at suppertime  1 tablet with food              Active

 

 Zoloft 50 mg  Orally Once a day  1 tablet  24h          Active







RESULTS

No Results



PROCEDURES

No Known procedures



INSTRUCTIONS





MEDICATIONS ADMINISTERED

No Known Medications



MEDICAL (GENERAL) HISTORY







 Type  Description  Date

 

 Medical History  depression   

 

 Medical History  adhd   

 

 Medical History  bipolar   

 

 Medical History  hyperlipidemia   

 

 Medical History  Hx of heart valve dysfunction but says this has resolved   

 

 Medical History  lactose intolerant   

 

 Hospitalization History  [facility] x4, last incident at age 9

## 2018-10-30 NOTE — XMS REPORT
[facility]

 Created on: 2018



Oliver Owens

External Reference #: [id number]

: 1995

Sex: Male



Demographics







 Address  [address] [place]  [address]

 

 Preferred Language  Unknown

 

 Marital Status  Unknown

 

 Synagogue Affiliation  Unknown

 

 Race  Unknown

 

 Ethnic Group  Unknown





Author







 Author  RHONA  PATTY

 

 [facility]

 

 Address  [address] [place]  [address]



 

 Phone  [phone]







Care Team Providers







 Care Team Member Name  Role  Phone

 

 RHONAPATTY  Unavailable  [phone]







PROBLEMS







 Type  Condition  ICD9-CM Code  UQP19-SG Code  Onset Dates  Condition Status  
SNOMED Code

 

 Problem  ADD (attention deficit disorder)     F90.0     Active  605450067

 

 Problem  Hypercholesterolemia with hyperglyceridemia     E78.2     Active  
376048605

 

 Problem  Schizoaffective disorder, bipolar type     F25.0     Active  73060591

 

 Problem  Gastroesophageal reflux disease, esophagitis presence not specified  
   K21.9     Active  404884113

 

 Problem  Social anxiety disorder     F40.10     Active  90929449

 

 Problem  Bipolar disorder, current episode mixed, unspecified     F31.60     
Active  20730149

 

 Problem  Impulse disorder, unspecified     F63.9     Active  10210813

 

 Problem  Attention deficit hyperactivity disorder (ADHD), predominantly 
inattentive type     F90.0     Active  65435174

 

 Problem  Anxiety     F41.9     Active  46744572







ALLERGIES







 Substance  Reaction  Event Type  Date  Status

 

 Lactose  Unknown  Drug Allergy  12 Oct, 2017  Active

 

 Cipro  hives  Drug Allergy  12 Oct, 2017  Active







ENCOUNTERS







 Encounter  Location  Date  Diagnosis

 

 [facility] [place]  [address] [place] [address]006-
6127     

 

 [facility] [place]  [id number] [place] [address] 79098-
7881    Gastroesophageal reflux disease, esophagitis presence not 
specified K21.9

 

 [facility] [place]  [id number] [place] [address] 16058-
7400    Schizoaffective disorder, bipolar type F25.0

 

 [facility] [place]  [address] [place] [address] 81206-
1609    Schizoaffective disorder, bipolar type F25.0 ; Impulse 
disorder, unspecified F63.9 ; Attention deficit hyperactivity disorder (ADHD), 
inattentive type, mild F90.0 and Social anxiety disorder F40.10

 

 [facility], [place]  [address] [place] [address] 43388-
4218    Schizoaffective disorder, bipolar type F25.0 ; Impulse 
disorder, unspecified F63.9 ; Attention deficit hyperactivity disorder (ADHD), 
inattentive type, mild F90.0 and Social anxiety disorder F40.10

 

 [facility] [place]  [id number] [place] [address] 77379-
4109  13 Dec, 2017  Schizoaffective disorder, bipolar type F25.0 ; Impulse 
disorder, unspecified F63.9 ; Attention deficit hyperactivity disorder (ADHD), 
inattentive type, mild F90.0 and Social anxiety disorder F40.10

 

 [place] [id number] [address]-
3921  11 Dec, 2017   

 

 [place] [id number] [place] [address] 27268-
5365    Schizoaffective disorder, bipolar type F25.0

 

 [facility] [place]  [address] [address]-
4550    Schizoaffective disorder, bipolar type F25.0 ; Impulse 
disorder, unspecified F63.9 ; Attention deficit hyperactivity disorder (ADHD), 
inattentive type, mild F90.0 and Social anxiety disorder F40.10

 

 [place] [address] [address]-
6898  12 Oct, 2017  Schizoaffective disorder, bipolar type F25.0 ; Impulse 
disorder, unspecified F63.9 ; Attention deficit hyperactivity disorder (ADHD), 
inattentive type, mild F90.0 and Social anxiety disorder F40.10

 

 [facility] [place]  [address] [address]-
8585  22 Sep, 2017   

 

 [facility] [place]  [address] [address]-
4631  08 Sep, 2017  Schizoaffective disorder, bipolar type F25.0

 

 [facility] [place]  [address] [address]-
5207  07 Sep, 2017  Other long term (current) drug therapy Z79.899

 

 [facility] [place]  [address] [place] [address] 21470-
4836  01 Sep, 2017  Schizoaffective disorder, bipolar type F25.0 ; Impulse 
disorder, unspecified F63.9 ; Attention deficit hyperactivity disorder (ADHD), 
predominantly inattentive type F90.0 ; Anxiety F41.9 and Other long term (
current) drug therapy Z79.899

 

 [facility] [place]  [address] [address]-
3657  11 Aug, 2017   

 

 [facility] [place]  [id number] [place] [address] 87087-
4104    Hypercholesterolemia with hyperglyceridemia E78.2

 

 [facility] [place]  [address] [place] [address] 21259-
8106    Schizoaffective disorder, bipolar type F25.0 and Impulse 
disorder, unspecified F63.9

 

 [facility] [place]  [address] [address]-
2821    Hypercholesterolemia with hyperglyceridemia E78.2 ; Impulse 
disorder, unspecified F63.9 ; Bipolar disorder, current episode mixed, 
unspecified F31.60 ; Schizoaffective disorder, bipolar type F25.0 and ADD (
attention deficit disorder) F90.0

 

 [facility] [place]  [address] [address]-
8995  17 Mar, 2017   

 

 [facility] [place]  [address] [address]-
5009  13 Mar, 2017   

 

 [facility] [place]  [id number] [place] [address] 61223-
1562  08 Mar, 2017  Schizoaffective disorder, bipolar type F25.0

 

 [facility] [place]  [address] [address]-
0019  06 Mar, 2017   

 

 [facility] [place]  [id number] [address]-
9108  06 Mar, 2017   

 

 [facility] [place]  [id number] [place] [address] 93885-
4203  02 Mar, 2017  Schizoaffective disorder, bipolar type F25.0

 

 [facility] [place]  [id number] [place] [address] 15493-
3222  02 Mar, 2017  Schizoaffective disorder, bipolar type F25.0

 

 [facility] [place]  [id number] [place] [address] 56629-
9721  10 Nov, 2016   

 

 [facility] [place]  [id number] [place] [address] 74800-
8922     

 

 [facility] [place]  [id number] [place] [address] 98571-
4342    Schizoaffective disorder, bipolar type F25.0 and Other long 
term (current) drug therapy Z79.899

 

 [facility] [place]  [id number] [place] [address] 82534-
0167    ADD (attention deficit disorder) F90.0

 

 [facility] [place]  [id number] [place] [address] 05411-
3960    ADD (attention deficit disorder) F90.0

 

 [facility] [place]  [id number] [place] [address] 01901-
8367    Schizoaffective disorder, bipolar type F25.0 and ADD (
attention deficit disorder) F90.0

 

 [facility] [place]  [address] [address]-
0995     

 

 [facility] [place]  [id number] [place] [address] 69199-
3678  27 Oct, 2015   

 

 [facility] [place]  [id number] [place] [address] 50596-
3707  09 Oct, 2015  Encounter for long-term (current) use of other medications 
Z79.899

 

 [facility] [place]  [id number] [place] [address] 32750-
7659  07 Oct, 2015  Schizoaffective disorder, bipolar type F25.0 ; Anxiety 
disorder, unspecified F41.9 ; Impulse disorder, unspecified F63.9 and Encounter 
for long-term (current) use of other medications Z79.899

 

 [facility], [place]  [address] [address]-
8914  26 Aug, 2015   

 

 [facility] [place]  [id number] [place] [address] 03192-
6109  26 Aug, 2015   

 

 [facility] [place]  [id number] [place] [address] 72503-
9039     

 

 [facility] [place]  [id number] [place] [address] 41697-
1497     

 

 [facility] [place]  [id number] [place] [address] 24757-
8249  12 May, 2015   

 

 [facility] [place]  [id number] [place] [address] 14361-
6856     

 

 [facility] [place]  [id number] [place] [address] 92321-
9139     

 

 [facility] [place]  [id number] [place] [address] 40033-
0985  04 Mar, 2015   

 

 [facility] [place]  [address] [address]-
3608  04 Mar, 2015   

 

 [facility] [place]  [address] [place] [address] 42853-
2163     

 

 [facility] [place]  [address] [address]-
3071     

 

 [facility] [place]  [id number] [place] [address] 24000-
9774     

 

 [facility] [place]  [address] [address]-
4353     

 

 [facility] [place]  [id number] [place] [address] 23112-
1208     

 

 [facility]  [address] [id number] [place] [address]-
9794     

 

 [facility] [place] [facility]  [address] [id number] [place] [address]-
7274     

 

 [facility] [place] [facility]  [address] [id number] [place] [address]-
1617     

 

 [facility] [place] [facility]  [address] [id number] [place] [address]-
3117     

 

 [facility] [place] [facility]  [address] [id number] [place] [address]-
8459     

 

 [facility] [place] [facility]  [address] [id number] [place] [address]-
8698  22 Dec, 2014   

 

 [facility]  [address] [id number] [place] [address]-
5175  22 Dec, 2014   

 

 [facility] [place] [facility]  [address] [id number] [place] [address]-
5051  02 Dec, 2014   

 

 [facility] [place] [facility]  [address] [id number] [place] [address]-
7615  02 Dec, 2014   

 

 [facility] [place] [facility]  [address] [facility] [id number] [place] [address]-
2300  01 Dec, 2014   

 

 [facility] [place] [facility]  [address] [id number] [place]
1589  01 Dec, 2014   

 

 [facility] [place] [facility]  [address] [id number] [address]-
2650     

 

 [facility] [place] [facility]  [address] [id number] [place] [address]-
4966     

 

 [facility] [place] [facility]  [address] [id number] [place] [address]-
5306     

 

 [facility] [place] [facility]  [address] [facility] [id number] [place] [address]-
5929     

 

 [facility] [place] [facility]  [address] [id number] [place] [address]-
3278  30 Oct, 2014   

 

 [facility] [place] [facility]  [address] [id number] [place] [address]-
5402  30 Oct, 2014   

 

 [facility] [place] [facility]  [address] [id number] [place] [address]-
4800  27 Oct, 2014   

 

 [facility] [place] [facility]  [address] [id number] [place] [address]-
5553  27 Oct, 2014   

 

 [facility] [place] [facility]  [address] [id number] [place] [address]-
2316  06 Oct, 2014   

 

 [facility] [place] [facility]  [address] [id number] [place] [address]-
3152  06 Oct, 2014   

 

 [facility] [place] [facility]  [address] [id number] [place] [address]-
1372  03 Oct, 2014   

 

 [facility] [place] [facility]  [address] [id number] [place] [address]-
6988  03 Oct, 2014   

 

 [facility] [place] [facility]  [address] [id number] [place] [address]-
9918  05 Sep,    

 

 [facility] [place] [facility]  [address] [id number] [place] [address]-
3599  05 Sep,    

 

 [facility] [place] [facility]  [address] [id number] [place] [address]-
2367  05 Sep,    

 

 [facility] [place] [facility]  [address] [id number] [place] [address]-
3278  05 Sep,    

 

 [facility] [place] [facility]  [address] [id number] [place] [address]-
4903  04 Aug, 2014   

 

 [facility] [place] [facility]  [address] [id number] [place] [address]-
8059  04 Aug, 2014   

 

 [facility] [place] [facility]  [address] [id number] [place] [address]-
3510     

 

 [facility] [place] [facility]  [address] [id number] [place] [address]-
2050     

 

 [facility] [place] [facility]  [address] [id number] [place] [address]-
6053     

 

 [facility] [place] [facility]  [address] [id number] [place] [address]-
3859     

 

 [facility] [place] [facility]  [address] [id number] [place] [address]-
3029     

 

 [facility] [place] [facility]  [address] [id number] [place] [address]-
5171     

 

 [facility] [place] [facility]  [address] [id number] [place] [address]-
2707     

 

 [facility] [place] [facility]  [address] [id number] [place] [address]-
0775     

 

 [facility] [place] [facility]  [address] [id number] [place] [address]-
4327     

 

 [facility] [place] [facility]  [address] [id number] [place] [address]-
8317     

 

 [facility] [place] [facility]  [address] [id number] [place] [address]-
0799  09 May, 2014   

 

 [facility] [place] [facility]  [address] [id number] [place] [address]-
5805  09 May, 2014   

 

 [facility] [place] [facility]  [address] [id number] [address]-
4879     

 

 [facility] [place] [facility]  [address] [id number] [address]-
4680     

 

 [facility] [place] [facility]  [address] [id number] [place] [address]-
7651     

 

 [facility] [place] [facility]  [address] [id number] [place] [address]-
4189     

 

 [facility] [place] [facility]  [address] [id number] [place] [address]-
9079     

 

 [facility] [place] [facility]  [address] [id number] [place] [address]-
0496  06 Mar, 2014   

 

 [facility]  [address] [id number] [place] [address]-
4642  06 Mar, 2014   

 

 [facility]  [address] [id number] [place] [address]-
6596     

 

 [facility]  [address] [id number] [place] [address]-
9666     

 

 [facility]  [address] [id number] [place] [address]-
3743  10 Chidi, 2014   

 

 [facility]  [address] [id number] [place] [address]-
0086  10 Chidi, 2014   

 

 [facility]  [address] [id number] [place] [address]-
9181  23 Dec, 2013   

 

 [facility]  [address] [id number] [place] [address]-
2596  23 Dec, 2013   

 

 [facility]  [address] [id number] [place] [address]-
1183  20 Dec, 2013   

 

 [facility]  [address] [id number] [place] [address]-
1469  20 Dec, 2013   

 

 [facility]  [address] [facility] [id number] [place] [address]-
0211  19 Dec, 2013   

 

 [facility] [place] [facility]  [address] [id number] [place] [address]-
2546  05 Dec, 2013   

 

 [facility]  [address] [id number] [place] [address]-
8193  05 Dec, 2013   

 

 [facility] [place] [facility]  [address] [id number] [place] [address]-
9224  03 Dec, 2013   

 

 [facility] [place] [facility]  [address] [id number] [place] [address]-
7896  03 Dec, 2013   

 

 [facility] [place] [facility]  [address] [id number] [place] [address]-
8480  18 Sep, 2013   

 

 [facility]  [address] [id number] [place] [address]-
9314     

 

 [facility] [place] [facility]  [address] [id number] [place] [address]-
2986     

 

 [facility] [place] [facility]  [address] [id number] [place] [address]-
9136     

 

 [facility] [place] [facility]  [address] [id number] [place] [address]-
5506  09 May, 2013   

 

 [facility]  [address] [id number] [place] [address]-
2045     

 

 [facility] [place] [facility]  [address] [id number] [place] [address]-
9556  18 Mar, 2013   

 

 [facility] [place] [facility]  [address] [id number] [place] [address]-
9226  07 Mar, 2013   

 

 [facility]  [address] [id number] [place] [address]-
5596  05 Mar, 2013   

 

 [facility] [place] [facility]  [address] [id number] [place] [address]-
6204     

 

 [facility] [place] [facility]  [address] [id number] [place] [address]-
2946     

 

 [facility] [place] [facility]  [address] [id number] [place] [address]-
7066     

 

 [facility] [place] [facility]  [address] [id number] [address]-
6736     

 

 [facility] [place] [facility]  [address] [id number] [place] [address]-
6086  18 Dec, 2012   

 

 [facility] [place] [facility]  [address] [id number] [place] [address]-
2876  18 Dec, 2012   

 

 [facility] [place] [facility]  [address] [id number] [place] [address]-
8476  06 Dec, 2012   

 

 [facility] [place] [facility]  [address] [id number] [place] [address]
2542  04 Dec, 2012   

 

 [facility] [place] [facility]  [address] [id number] [place] [address]-
4162  04 Dec, 2012   

 

 [facility] [place] [facility]  [address] [id number] [place] [address]-
3932     

 

 [facility] [place] [facility]  [address] [id number] [place] [address]
2542     

 

 [facility] [place] [facility]  [address] [id number] [place] [address]-
9228  31 Oct, 2012   

 

 [facility] [place] [facility]  [address] [id number] [place] [address]-
3888  31 Oct, 2012   

 

 [facility] [place] [facility]  [address] [id number] [place] [address]-
5652  26 Oct, 2012   

 

 [facility] [place] [facility]  [address] [id number] [place] [address]-
1019  26 Oct, 2012   

 

 [facility] [place] [facility]  [address] [id number] [place] [address]-
1953  23 Oct, 2012   

 

 [facility] [place] [facility]  [address] [id number] [place] [address]-
3876  23 Oct, 2012   

 

 [facility] [place] [facility]  [address] [id number] [place] [address]-
8397  18 Sep, 2012   

 

 [facility] [place] [facility]  [address] [id number] [place] [address]-
5227  23 Aug, 2012   

 

 [facility] [place] [facility]  [address] [id number] [place] [address]-
4254  22 Aug, 2012   

 

 [facility] [place] [facility]  [address] [id number] [place] [address]-
5765     

 

 [facility] [place] [facility]  [address] [id number] [place] [address]
2546     

 

 [facility] [place] [facility]  [address] [id number] [place] [address]
2546     

 

 [facility] [place] [facility]  [address] [id number] [place] [address]-
9973     

 

 [facility] [place] [facility]  [address] [id number] [place] [address]-
8306  01 May, 2012   

 

 [facility] [place] [facility]  [address] [id number] [place] [address]-
8516     

 

 [facility] [place] [facility]  [address] [id number] [place] [address]-
1178  10 Apr, 2012   

 

 [facility] [place] [facility]  [address] [id number] [place] [address]-
3859  14 Mar, 2012   

 

 [facility] [place] [facility]  [address] [id number] [place] [address]-
5079     

 

 [facility] [place] [facility]  [address] [id number] [place] [address]-
0737     

 

 [facility] [place] [facility]  [address] [id number] [place] [address]-
7940  26 Dec, 2011   

 

 [facility] [place] [facility]  [address] [id number] [place] [address]-
6262  21 Dec, 2011   

 

 [facility] [place] [facility]  [address] [id number] [place] [address]-
0346  19 Dec, 2011   

 

 [facility] [place] [facility]  [address] [id number] [place] [address]-
1178  16 Dec, 2011   

 

 [facility] [place] [facility]  [address] [id number] [place] [address]-
0636  16 Dec, 2011   

 

 [facility] [place] [facility]  [address] [id number] [place] [address]-
9869  14 Dec, 2011   

 

 [facility] [place] [facility]  [address] [id number] [place] [address]
2546  14 Dec, 2011   

 

 [facility] [place] [facility]  [address] [id number] [place] [address]-
2546  17 2011   

 

 [facility] [place] [facility]  [address] [id number] [place] [address]-
1580     

 

 [facility] [place]  [address] [facility] [id number] [place] [address]-
9712  18 Oct, 2011   

 

 [facility] [place]  [address] [facility] [id number] [address]-
3686  10 Dec, 2010   

 

 [facility] [place]  [address] [facility] [id number] [address]
2546     

 

 [facility] [place]  [address] [facility] [id number] [address]-
6336     

 

 [facility] [place]  [address] [facility] [id number] [address]
9176  26 Oct, 2010   







IMMUNIZATIONS

No Known Immunizations



SOCIAL HISTORY

Never Assessed



REASON FOR VISIT

 f/u



PLAN OF CARE







 Activity  Details









  









 Follow Up  4 Weeks Reason: f/u







VITAL SIGNS







 Height  65 in  2017-10-12

 

 Weight  188.6 lbs  2017-10-12

 

 Heart Rate  76 bpm  2017-10-12

 

 Respiratory Rate  20   2017-10-12

 

 BMI  31.38 kg/m2  2017-10-12

 

 Blood pressure systolic  104 mmHg  2017-10-12

 

 Blood pressure diastolic  78 mmHg  2017-10-12







MEDICATIONS







 Medication  Instructions  Dosage  Frequency  Start Date  End Date  Duration  
Status

 

 Lithium Carbonate 300 MG  Orally twice a day  2 capsules  12h           Active

 

 BusPIRone HCl 10 mg  Orally Twice a day  1 tablet  12h  12 Oct, 2017     30 
days  Active

 

 Latuda 40 mg  Orally at suppertime  1 tablet with food              Active







RESULTS

No Results



PROCEDURES

No Known procedures



INSTRUCTIONS





MEDICATIONS ADMINISTERED

No Known Medications



MEDICAL (GENERAL) HISTORY







 Type  Description  Date

 

 Medical History  depression   

 

 Medical History  adhd   

 

 Medical History  bipolar   

 

 Medical History  hyperlipidemia   

 

 Medical History  Hx of heart valve dysfunction but says this has resolved   

 

 Medical History  lactose intolerant   

 

 Hospitalization History  [facility] x4, last incident at age 9

## 2018-10-30 NOTE — XMS REPORT
[facility]

 Created on: 2018



Oliver Owens

External Reference #: [id number]

: 1995

Sex: Male



Demographics







 Address  [address] [place]  [address]

 

 Preferred Language  Unknown

 

 Marital Status  Unknown

 

 Sikhism Affiliation  Unknown

 

 Race  Unknown

 

 Ethnic Group  Unknown





Author







 Author  PATTY MELGOZA

 

 [facility]

 

 Address  [address] [place]  [address]



 

 Phone  [phone]







Care Team Providers







 Care Team Member Name  Role  Phone

 

 RHONAPATTY  Unavailable  [phone]







PROBLEMS







 Type  Condition  ICD9-CM Code  SBE28-GU Code  Onset Dates  Condition Status  
SNOMED Code

 

 Problem  ADD (attention deficit disorder)     F90.0     Active  714167035

 

 Problem  Hypercholesterolemia with hyperglyceridemia     E78.2     Active  
944731208

 

 Problem  Schizoaffective disorder, bipolar type     F25.0     Active  68000502

 

 Problem  Gastroesophageal reflux disease, esophagitis presence not specified  
   K21.9     Active  506678598

 

 Problem  Social anxiety disorder     F40.10     Active  10843509

 

 Problem  Bipolar disorder, current episode mixed, unspecified     F31.60     
Active  64897515

 

 Problem  Impulse disorder, unspecified     F63.9     Active  75083140

 

 Problem  Attention deficit hyperactivity disorder (ADHD), predominantly 
inattentive type     F90.0     Active  83720742

 

 Problem  Anxiety     F41.9     Active  01244878







ALLERGIES

No Information



ENCOUNTERS







 Encounter  Location  Date  Diagnosis

 

 [facility], [place]  [address] [place] [address]-
2177    Gastroesophageal reflux disease, esophagitis presence not 
specified K21.9

 

 [facility], [place]  [address] [place] [address] 96990-
5794    Schizoaffective disorder, bipolar type F25.0

 

 [facility], [place]  [address] [place] [address] 47712-
8418    Schizoaffective disorder, bipolar type F25.0 ; Impulse 
disorder, unspecified F63.9 ; Attention deficit hyperactivity disorder (ADHD), 
inattentive type, mild F90.0 and Social anxiety disorder F40.10

 

 [facility], [place]  [address] [place] [address] 98270-
8611    Schizoaffective disorder, bipolar type F25.0 ; Impulse 
disorder, unspecified F63.9 ; Attention deficit hyperactivity disorder (ADHD), 
inattentive type, mild F90.0 and Social anxiety disorder F40.10

 

 [facility], [place]  [address] [place] [address] 10587-
1283  13 Dec, 2017  Schizoaffective disorder, bipolar type F25.0 ; Impulse 
disorder, unspecified F63.9 ; Attention deficit hyperactivity disorder (ADHD), 
inattentive type, mild F90.0 and Social anxiety disorder F40.10

 

 [place] [address] [place] [address] 37651-
4431  11 Dec, 2017   

 

 [facility], [place]  [id number] [place] [address] 81195-
8355    Schizoaffective disorder, bipolar type F25.0

 

 [place] [id number] [place] [address] 79099-
8853    Schizoaffective disorder, bipolar type F25.0 ; Impulse 
disorder, unspecified F63.9 ; Attention deficit hyperactivity disorder (ADHD), 
inattentive type, mild F90.0 and Social anxiety disorder F40.10

 

 [place] [address] [address]-
6750  12 Oct, 2017  Schizoaffective disorder, bipolar type F25.0 ; Impulse 
disorder, unspecified F63.9 ; Attention deficit hyperactivity disorder (ADHD), 
inattentive type, mild F90.0 and Social anxiety disorder F40.10

 

 [facility], [place]  [address] [address]-
6964  22 Sep, 2017   

 

 [facility], [place]  [address] [place] [address] 82145-
1846  08 Sep, 2017  Schizoaffective disorder, bipolar type F25.0

 

 [facility], [place]  [address] [place] [address] 93055-
7661  07 Sep, 2017  Other long term (current) drug therapy Z79.899

 

 [place] [id number] [place] [address] 00943-
5509  01 Sep, 2017  Schizoaffective disorder, bipolar type F25.0 ; Impulse 
disorder, unspecified F63.9 ; Attention deficit hyperactivity disorder (ADHD), 
predominantly inattentive type F90.0 ; Anxiety F41.9 and Other long term (
current) drug therapy Z79.899

 

 [facility], [place]  [id number] [place] [address] 72833-
4166  11 Aug, 2017   

 

 [facility], [place]  [id number] [place] [address] 86924-
0233    Hypercholesterolemia with hyperglyceridemia E78.2

 

 [place] [id number] [place] [address] 09920-
5768    Schizoaffective disorder, bipolar type F25.0 and Impulse 
disorder, unspecified F63.9

 

 [facility] [place]  [id number] [place] [address] 78406-
1532    Hypercholesterolemia with hyperglyceridemia E78.2 ; Impulse 
disorder, unspecified F63.9 ; Bipolar disorder, current episode mixed, 
unspecified F31.60 ; Schizoaffective disorder, bipolar type F25.0 and ADD (
attention deficit disorder) F90.0

 

 [place] [address] [place] [address] 33152-
9847  17 Mar, 2017   

 

 [facility], [place]  [id number] [place] [address] 50675-
0766  13 Mar, 2017   

 

 [facility] [place]  [id number] [place] [address] 53710-
8540  08 Mar, 2017  Schizoaffective disorder, bipolar type F25.0

 

 [facility] [place]  [id number] [place] [address] 79371-
5643  06 Mar, 2017   

 

 [facility] [place]  [id number] [place] [address] 33407-
0236  06 Mar, 2017   

 

 [facility] [place]  [id number] [place] [address] 59910-
9431  02 Mar, 2017  Schizoaffective disorder, bipolar type F25.0

 

 [facility], [place]  [address] [address]-
3674  02 Mar, 2017  Schizoaffective disorder, bipolar type F25.0

 

 [facility] [place]  [address] [address]-
7483  10 Nov, 2016   

 

 [facility] [place]  [id number] [place] [address] 08941-
5327     

 

 [facility] [place]  [id number] [place] [address] 85164-
3053    Schizoaffective disorder, bipolar type F25.0 and Other long 
term (current) drug therapy Z79.899

 

 [facility] [place]  [id number] [place] [address] 01654-
5542    ADD (attention deficit disorder) F90.0

 

 [facility] [place]  [id number] [place] [address] 38549-
8326    ADD (attention deficit disorder) F90.0

 

 [facility] [place]  [address] [place] [address] 16727-
8137    Schizoaffective disorder, bipolar type F25.0 and ADD (
attention deficit disorder) F90.0

 

 [facility], [place]  [address] [address]-
6061     

 

 [facility] [place]  [address] [address]-
0759  27 Oct, 2015   

 

 [facility], [place]  [address] [address]-
2405  09 Oct, 2015  Encounter for long-term (current) use of other medications 
Z79.899

 

 [facility] [place]  [address] [place] [address] 73433-
8086  07 Oct, 2015  Schizoaffective disorder, bipolar type F25.0 ; Anxiety 
disorder, unspecified F41.9 ; Impulse disorder, unspecified F63.9 and Encounter 
for long-term (current) use of other medications Z79.899

 

 [facility]  [address] [id number] [place] [address]-
6209  26 Aug, 2015   

 

 [facility] [place] [facility]  [address] [id number] [place] [address]-
4475  26 Aug, 2015   

 

 [facility] [place] [facility]  [address] [facility] [id number] [place] [address]-
9752     

 

 [facility] [place] [facility]  [address] [id number] [address]-
5003     

 

 [facility] [place] [facility]  [address] [id number] [place] [address]-
0566  12 May, 2015   

 

 [facility] [place] [facility]  [address] [id number] [address]-
0347     

 

 [facility] [place] [facility]  [address] [address]-
1530     

 

 [facility] [place] [facility]  [id number] [address]-
7932  04 Mar, 2015   

 

 [facility] [place] [facility]  [id number] [address]-
3178  04 Mar, 2015   

 

 [facility] [place] [facility]  [id number] [address]-
9731     

 

 [facility] [place] [facility]  [id number] [address]-
4710     

 

 [facility] [place] [facility]  [address] [facility] [id number] [address]-
5122     

 

 [facility] [place] [facility]  [address] [id number] [place] [address]-
6751     

 

 [facility] [place] [facility]  [address] [facility] [id number] [place] [address]-
5199     

 

 [facility] [place] [facility]  [address] [facility] [id number] [place] [address]-
5052     

 

 [facility] [place] [facility]  [address] [id number] [place] [address]-
7007     

 

 [facility]  [address] [id number] [place] [address]-
6415     

 

 [facility] [place] [facility]  [address] [id number] [place] [address]-
2372     

 

 [facility]  [address] [id number] [place] [address]-
4134     

 

 [facility] [place] [facility]  [address] [id number] [place] [address]-
5369  22 Dec, 2014   

 

 [facility]  [address] [id number] [place] [address]-
7288  22 Dec, 2014   

 

 [facility] [place] [facility]  [address] [id number] [place] [address]-
1887  02 Dec, 2014   

 

 [facility] [place] [facility]  [address] [id number] [place] [address]-
2844  02 Dec, 2014   

 

 [facility]  [address] [id number] [place] [address]-
8855  01 Dec, 2014   

 

 [facility] [place] [facility]  [address] [id number] [place] [address]-
9201  01 Dec, 2014   

 

 [facility]  [address] [id number] [place] [address]-
1882     

 

 [facility] [place] [facility]  [address] [id number] [place] [address]-
8422     

 

 [facility] [place] [facility]  [address] [id number] [place] [address]-
0062     

 

 [facility] [place] [facility]  [address] [id number] [place] [address]-
2245     

 

 [facility] [place] [facility]  [address] [id number] [place] [address]-
1663  30 Oct, 2014   

 

 [facility] [place] [facility]  [address] [id number] [place] [address]-
4401  30 Oct, 2014   

 

 [facility] [place] [facility]  [address] [id number] [place] [address]-
2866  27 Oct, 2014   

 

 [facility] [place] [facility]  [address] [id number] [place] [address]-
7996  27 Oct, 2014   

 

 [facility] [place] [facility]  [address] [id number] [place]
9089  06 Oct, 2014   

 

 [facility] [place] [facility]  [address] [id number] [place] [address]-
2227  06 Oct, 2014   

 

 [facility] [place] [facility]  [address] [id number] [place] [address]-
0050  03 Oct, 2014   

 

 [facility] [place] [facility]  [address] [id number] [place] [address]-
2993  03 Oct, 2014   

 

 [facility] [place] [facility]  [address] [id number] [place] [address]-
5605  05 Sep, 2014   

 

 [facility] [place] [facility]  [address] [id number] [place] [address]-
6878  05 Sep, 2014   

 

 [facility] [place] [facility]  [address] [id number] [place] [address]-
9050  05 Sep, 2014   

 

 [facility] [place] [facility]  [address] [id number] [place] [address]-
2186  05 Sep, 2014   

 

 [facility] [place] [facility]  [address] [id number] [place] [address]-
0654  04 Aug, 2014   

 

 [facility] [place] [facility]  [address] [id number] [place] [address]-
8252  04 Aug, 2014   

 

 [facility] [place] [facility]  [address] [id number] [place] [address]-
4924     

 

 [facility] [place] [facility]  [address] [id number] [place] [address]-
2944     

 

 [facility] [place] [facility]  [address] [id number] [place] [address]-
9771     

 

 [facility] [place] [facility]  [address] [id number] [place] [address]-
2310     

 

 [facility] [place] [facility]  [address] [id number] [place] [address]-
3741     

 

 [facility] [place] [facility]  [address] [id number] [place] [address]-
8013     

 

 [facility] [place] [facility]  [address] [id number] [place] [address]-
5416     

 

 [facility] [place] [facility]  [address] [id number] [place] [address]-
9242     

 

 [facility] [place] [facility]  [address] [id number] [place] [address]-
7755     

 

 [facility] [place] [facility]  [address] [id number] [place] [address]-
2055     

 

 [facility] [place] [facility]  [address] [id number] [place] [address]-
1170  09 May, 2014   

 

 [facility] [place] [facility]  [address] [id number] [place] [address]-
3085  09 May, 2014   

 

 [facility] [place] [facility]  [address] [id number] [place] [address]-
5674     

 

 [facility] [place] [facility]  [address] [id number] [place] [address]-
5606     

 

 [facility] [place] [facility]  [address] [id number] [place] [address]-
0213     

 

 [facility] [place] [facility]  [address] [id number] [place] [address]-
6402     

 

 [facility] [place] [facility]  [address] [id number] [place] [address]-
7731     

 

 [facility] [place] [facility]  [address] [id number] [place] [address]-
8607  06 Mar, 2014   

 

 [facility] [place] [facility]  [address] [id number] [place] [address]-
3056  06 Mar, 2014   

 

 [facility] [place] [facility]  [address] [id number] [place] [address]-
8184     

 

 [facility]  [address] [id number] [place] [address]-
6191     

 

 [facility] [place] [facility]  [address] [id number] [place] [address]-
9964  10 Chidi, 2014   

 

 [facility] [place] [facility]  [address] [id number] [place] [address]-
7225  10 Chidi, 2014   

 

 [facility] [place] [facility]  [address] [id number] [place] [address]-
9296  23 Dec, 2013   

 

 [facility] [place] [facility]  [address] [id number] [place] [address]-
3660  23 Dec, 2013   

 

 [facility]  [address] [facility] [id number] [place] [address]-
5367  20 Dec, 2013   

 

 [facility] [place] [facility]  [address] [id number] [place] [address]-
7113  20 Dec, 2013   

 

 [facility]  [address] [id number] [place] [address]-
9794  19 Dec, 2013   

 

 [facility] [place] [facility]  [address] [id number] [place] [address]-
8402  05 Dec, 2013   

 

 [facility] [place] [facility]  [address] [id number] [place] [address]-
2020  05 Dec, 2013   

 

 [facility] [place] [facility]  [address] [id number] [place] [address]-
9450  03 Dec, 2013   

 

 [facility] [place] [facility]  [address] [id number] [place] [address]-
9695  03 Dec, 2013   

 

 [facility] [place] [facility]  [address] [id number] [place] [address]-
0826  18 Sep, 2013   

 

 [facility] [place] [facility]  [address] [id number] [place] [address]-
8166     

 

 [facility] [place] [facility]  [address] [id number] [address]-
4556     

 

 [facility]  [address] [id number] [place] [address]-
0304     

 

 [facility]  [address] [id number] [place] [address]
2546  09 May, 2013   

 

 [facility]  [address] [id number] [place] [address]
2546     

 

 [facility] [place] [facility]  [address] [id number] [place] [address]
2546  18 Mar, 2013   

 

 [facility]  [address] [id number] [place] [address]-
4196  07 Mar, 2013   

 

 [facility]  [address] [id number] [place] [address]
2546  05 Mar, 2013   

 

 [facility]  [address] [id number] [place] [address]-
2526     

 

 [facility] [place] [facility]  [address] [id number] [place] [address]-
8498     

 

 [facility]  [address] [id number] [place] [address]-
4527     

 

 [facility]  [address] [id number] [place] [address]
2546     

 

 [facility]  [address] [id number] [place] [address]-
0067  18 Dec, 2012   

 

 [facility] [place] [facility]  [address] [id number] [place] [address]
2546  18 Dec, 2012   

 

 [facility] [place] [facility]  [address] [id number] [place] [address]-
2546  06 Dec, 2012   

 

 [facility] [place] [facility]  [address] [id number] [place] [address]
2546  04 Dec, 2012   

 

 [facility] [place] [facility]  [address] [id number] [place] [address]-
2546  04 Dec, 2012   

 

 [facility] [place] [facility]  [address] [id number] [place] [address]-
1037     

 

 [facility] [place] [facility]  [address] [id number] [place] [address]-
0939     

 

 [facility] [place] [facility]  [address] [id number] [place] [address]-
9968  31 Oct, 2012   

 

 [facility] [place] [facility]  [address] [id number] [place] [address]-
2015  31 Oct, 2012   

 

 [facility] [place] [facility]  [address] [id number] [place] [address]-
4656  26 Oct, 2012   

 

 [facility] [place] [facility]  [address] [id number] [place] [address]-
6840  26 Oct, 2012   

 

 [facility] [place] [facility]  [address] [id number] [place]
3072  23 Oct, 2012   

 

 [facility] [place] [facility]  [address] [id number] [place] [address]-
2337  23 Oct, 2012   

 

 [facility] [place] [facility]  [address] [id number] [place] [address]-
1355  18 Sep, 2012   

 

 [facility] [place] [facility]  [address] [id number] [place] [address]-
0586  23 Aug, 2012   

 

 [facility] [place] [facility]  [address] [id number] [place] [address]-
4770  22 Aug, 2012   

 

 [facility] [place] [facility]  [address] [id number] [place] [address]-
5083     

 

 [facility] [place] [facility]  [address] [id number] [place] [address]-
9559     

 

 [facility] [place] [facility]  [address] [id number] [place] [address]-
7076     

 

 [facility] [place] [facility]  [address] [id number] [place] [address]-
2546     

 

 [facility] [place] [facility]  [address] [id number] [place] [address]-
6636  01 May, 2012   

 

 [facility]  [address] [id number] [place] [address]-
2264     

 

 [facility] [place] [facility]  [address] [id number] [place] [address]-
1639  10 Apr, 2012   

 

 [facility] [place] [facility]  [address] [id number] [place] [address]-
7249  14 Mar, 2012   

 

 [facility] [place] [facility]  [address] [id number] [place] [address]-
7047     

 

 [facility] [place] [facility]  [address] [id number] [place] [address]-
[address]     

 

 [facility] [place] [facility]  [address] [id number] [place] [address]-
2642  26 Dec, 2011   

 

 [facility] [place] [facility]  [address] [id number] [place] [address]-
5318  21 Dec, 2011   

 

 [facility] [place] [facility]  [address] [id number] [place] [address]-
4865  19 Dec, 2011   

 

 [facility] [place] [facility]  [address] [id number] [place] [address]-
1180  16 Dec, 2011   

 

 [facility] [place] [facility]  [address] [id number] [place] [address]-
7948  16 Dec, 2011   

 

 [facility] [place] [facility]  [address] [id number] [place] [address]-
3326  14 Dec, 2011   

 

 [facility] [place] [facility]  [address] [id number] [place] [address]-
0683  14 Dec, 2011   

 

 [facility] [place] [facility]  [address] [id number] [place] [address]-
5567  17 2011   

 

 [facility] [place] [facility]  [address] [id number] [place] [address]-
1036  17 2011   

 

 [facility] [place] [facility]  [address] [id number] [place] [address]-
9160  18 Oct, 2011   

 

 [facility] [place] [facility]  [address] [id number] [address]-
2736  10 Dec, 2010   

 

 [facility] [place]  [address] [facility] [id number] [address]-
1321     

 

 [facility] [place]  [address] [facility] [id number] [place] [address]-
9165     

 

 [facility] [place]  [address] [facility] [id number] [place], [place] [address]39-
2713  26 Oct, 2010   







IMMUNIZATIONS

No Known Immunizations



SOCIAL HISTORY

Never Assessed



REASON FOR VISIT

PALS-latuda



PLAN OF CARE





VITAL SIGNS





MEDICATIONS







 Medication  Instructions  Dosage  Frequency  Start Date  End Date  Duration  
Status

 

 Latuda 40 mg  Orally at suppertime  1 tablet with food           90 days  
Active







RESULTS

No Results



PROCEDURES

No Known procedures



INSTRUCTIONS





MEDICATIONS ADMINISTERED

No Known Medications



MEDICAL (GENERAL) HISTORY







 Type  Description  Date

 

 Medical History  depression   

 

 Medical History  adhd   

 

 Medical History  bipolar   

 

 Medical History  hyperlipidemia   

 

 Medical History  Hx of heart valve dysfunction but says this has resolved   

 

 Medical History  lactose intolerant   

 

 Hospitalization History  [facility] x4, last incident at age 9

## 2018-10-30 NOTE — XMS REPORT
[facility]

 Created on: 10/04/2017



Oliver Owens

External Reference #: [id number]

: 1995

Sex: Male



Demographics







 Address  [address] [place]  [address]

 

 Preferred Language  Unknown

 

 Marital Status  Unknown

 

 Shinto Affiliation  Unknown

 

 Race  Unknown

 

 Ethnic Group  Unknown





Author







 Author  PADMA Calero

 

 [facility]

 

 Address  [address] [place]  [address]



 

 Phone  [phone]







Care Team Providers







 Care Team Member Name  Role  Phone

 

 PADMA Calero  Unavailable  [phone]







PROBLEMS







 Type  Condition  ICD9-CM Code  HES40-NA Code  Onset Dates  Condition Status  
SNOMED Code

 

 Problem  Schizoaffective disorder, bipolar type     F25.0     Active  42542694

 

 Problem  Anxiety     F41.9     Active  97722295

 

 Problem  Attention deficit hyperactivity disorder (ADHD), predominantly 
inattentive type     F90.0     Active  41545914

 

 Problem  Impulse disorder, unspecified     F63.9     Active  10902640

 

 Problem  ADD (attention deficit disorder)     F90.0     Active  811450193

 

 Problem  Hypercholesterolemia with hyperglyceridemia     E78.2     Active  
211960955

 

 Problem  Bipolar disorder, current episode mixed, unspecified     F31.60     
Active  62168229







ALLERGIES

No Information



SOCIAL HISTORY

Never Assessed



PLAN OF CARE





VITAL SIGNS





MEDICATIONS







 Medication  Instructions  Dosage  Frequency  Start Date  End Date  Duration  
Status

 

 Latuda 40 MG  Orally at suppertime  1 tablet with food     13 Mar, 2017     90 
days  Active







RESULTS

No Results



PROCEDURES

No Known procedures



IMMUNIZATIONS

No Known Immunizations



MEDICAL (GENERAL) HISTORY







 Type  Description  Date

 

 Medical History  depression   

 

 Medical History  adhd   

 

 Medical History  bipolar   

 

 Medical History  hyperlipidemia   

 

 Medical History  Hx of heart valve dysfunction but says this has resolved   

 

 Hospitalization History  [facility] x4, last incident at age 9

## 2018-10-30 NOTE — XMS REPORT
[facility]

 Created on: 10/07/2015



Luis A Owensdanisha

External Reference #: [id number]

: 1995

Sex: Male



Demographics







 Address  [address] [place]  [address]

 

 Home Phone  [phone]

 

 Preferred Language  Unknown

 

 Marital Status  Unknown

 

 Taoist Affiliation  Unknown

 

 Race  White

 

 Ethnic Group  Not  or 





Author







 PAIP Gong

 

 [facility]  [employer]

 

 Address  Unknown

 

 Phone  Unavailable







Care Team Providers







 Care Team Member Name  Role  Phone

 

 PAPI PRICE  CP  Unavailable



                                                                



Allergies, Adverse Reactions, Alerts

          





 Substance  Reaction  Event Type

 

 Cipro  hives  Drug Allergy



                                                                               
         



Problems

          





 Problem Type  Condition  Code  Onset Dates  Condition Status

 

 Assessment  Encounter for long-term (current) use of other medications  
Z79.899     Active

 

 Assessment  Anxiety disorder, unspecified  F41.9     Active

 

 Assessment  Impulse disorder, unspecified  F63.9     Active

 

 Problem  Anxiety state, unspecified  300.00     Active

 

 Problem  Unspecified psychosis  298.9     Active

 

 Problem  Schizoaffective disorder, unspecified  295.70     Active

 

 Problem  Bipolar I disorder, most recent episode (or current) mixed, 
unspecified  296.60     Active

 

 Assessment  Schizoaffective disorder, bipolar type  F25.0     Active

 

 Problem  Impulse control disorder, unspecified  312.30     Active

 

 Problem  Other dysfunctions of sleep stages or arousal from sleep  307.47     
Active



                                                                               
                                                                               
                    



Medications

          





 Medication  Code System  Code  Instructions  Start Date  End Date  Status  
Dosage

 

 Vyvanse  NDC  [address]-06  30 MG Orally Once a day qAM for ADHD    Take for 
30 days then start Vyvanse 40 mg  May 12, 2015        1 capsule

 

 Lithium Carbonate  NDC  [address]-07  300 MG Orally Once a day with dinner   
        3 capsules

 

 Seroquel XR  NDC  [address]-60  200 MG Orally Once a day  Oct 07, 2015        
1 tablet in the evening

 

 Abilify  NDC  [address]-65  10 MG Orally Once a day  2015        1 
Tablet



                                                                               
                                       



Procedures

          





 Procedure  Coding System  Code  Date

 

 Office Visit, Est Pt., Level 4  CPT-4  14076  Oct 07, 2015



                                                                               
                   



Vital Signs

          





 Date/Time:  Oct 07, 2015

 

 Temperature  98.6 F

 

 Weight  196.2 lbs

 

 Height  64 in

 

 BMI  33.67 Index

 

 Blood Pressure Diastolic  80 mmHg

 

 Blood Pressure Systolic  120 mmHg

 

 Cardiac Monitoring Heart Rate  64 bpm



                                                                              



Results

          No Known Results                                                     
               



Summary Purpose

          [employer] Submission

## 2018-10-30 NOTE — XMS REPORT
[facility]

 Created on: 10/01/2017



Oliver Owens

External Reference #: [id number]

: 1995

Sex: Male



Demographics







 Address  [address] [place]  [address]

 

 Preferred Language  Unknown

 

 Marital Status  Unknown

 

 Mandaen Affiliation  Unknown

 

 Race  Unknown

 

 Ethnic Group  Unknown





Author







 Author  KATHERYN MORGAN

 

 [facility]

 

 Address  [address] [place]  [address]



 

 Phone  [phone]







Care Team Providers







 Care Team Member Name  Role  Phone

 

 KATHERYN MORGAN  Unavailable  [phone]







PROBLEMS







 Type  Condition  ICD9-CM Code  AHQ20-HE Code  Onset Dates  Condition Status  
SNOMED Code

 

 Problem  Schizoaffective disorder, bipolar type     F25.0     Active  84061923

 

 Problem  Anxiety     F41.9     Active  12306382

 

 Problem  Attention deficit hyperactivity disorder (ADHD), predominantly 
inattentive type     F90.0     Active  17284430

 

 Problem  Impulse disorder, unspecified     F63.9     Active  62231488

 

 Problem  ADD (attention deficit disorder)     F90.0     Active  919271394

 

 Problem  Hypercholesterolemia with hyperglyceridemia     E78.2     Active  
631822915

 

 Problem  Bipolar disorder, current episode mixed, unspecified     F31.60     
Active  16874837







ALLERGIES

No Information



SOCIAL HISTORY

Never Assessed



PLAN OF CARE





VITAL SIGNS





MEDICATIONS

Unknown Medications



RESULTS

No Results



PROCEDURES

No Known procedures



IMMUNIZATIONS

No Known Immunizations



MEDICAL (GENERAL) HISTORY







 Type  Description  Date

 

 Medical History  depression   

 

 Medical History  adhd   

 

 Medical History  bipolar   

 

 Medical History  hyperlipidemia   

 

 Medical History  Hx of heart valve dysfunction but says this has resolved   

 

 Hospitalization History  [facility] x4, last incident at age 9

## 2018-10-30 NOTE — XMS REPORT
[facility]

 Created on: 2018



Oliver Owens

External Reference #: [id number]

: 1995

Sex: Male



Demographics







 Address  [address] [place]  [address]

 

 Preferred Language  Unknown

 

 Marital Status  Unknown

 

 Buddhism Affiliation  Unknown

 

 Race  Unknown

 

 Ethnic Group  Unknown





Author







 Author  RHONA  PATTY

 

 [facility]

 

 Address  [address] [place]  [address]



 

 Phone  [phone]







Care Team Providers







 Care Team Member Name  Role  Phone

 

 RHONA,  PATTY  Unavailable  [phone]







PROBLEMS







 Type  Condition  ICD9-CM Code  JLK28-MH Code  Onset Dates  Condition Status  
SNOMED Code

 

 Problem  ADD (attention deficit disorder)     F90.0     Active  123104388

 

 Problem  Hypercholesterolemia with hyperglyceridemia     E78.2     Active  
151502691

 

 Problem  Schizoaffective disorder, bipolar type     F25.0     Active  44656948

 

 Problem  Gastroesophageal reflux disease, esophagitis presence not specified  
   K21.9     Active  736065633

 

 Problem  Social anxiety disorder     F40.10     Active  32972622

 

 Problem  Bipolar disorder, current episode mixed, unspecified     F31.60     
Active  76306709

 

 Problem  Impulse disorder, unspecified     F63.9     Active  82995491

 

 Problem  Attention deficit hyperactivity disorder (ADHD), predominantly 
inattentive type     F90.0     Active  80826305

 

 Problem  Anxiety     F41.9     Active  07072185







ALLERGIES







 Substance  Reaction  Event Type  Date  Status

 

 Lactose  Unknown  Drug Allergy  13 Dec, 2017  Active

 

 Cipro  hives  Drug Allergy  13 Dec, 2017  Active







ENCOUNTERS







 Encounter  Location  Date  Diagnosis

 

 [place] [id number] [place] [address] 13132-
2106    Gastroesophageal reflux disease, esophagitis presence not 
specified K21.9

 

 [facility]  [id number] [place] [address] 33400-
1749    Schizoaffective disorder, bipolar type F25.0

 

 [facility]  [id number] [place] [address] 50277-
3312    Schizoaffective disorder, bipolar type F25.0 ; Impulse 
disorder, unspecified F63.9 ; Attention deficit hyperactivity disorder (ADHD), 
inattentive type, mild F90.0 and Social anxiety disorder F40.10

 

 [facility]  [id number] [address]
8968    Schizoaffective disorder, bipolar type F25.0 ; Impulse 
disorder, unspecified F63.9 ; Attention deficit hyperactivity disorder (ADHD), 
inattentive type, mild F90.0 and Social anxiety disorder F40.10

 

 [facility]  [address] [place] [address] 44721-
0427  13 Dec, 2017  Schizoaffective disorder, bipolar type F25.0 ; Impulse 
disorder, unspecified F63.9 ; Attention deficit hyperactivity disorder (ADHD), 
inattentive type, mild F90.0 and Social anxiety disorder F40.10

 

 [place] [id number] [place] [address] 73330-
9070  11 Dec, 2017   

 

 [place] [id number] [place] [address] 32762-
2821    Schizoaffective disorder, bipolar type F25.0

 

 [place] [id number] [place] [address] 58267-
4606    Schizoaffective disorder, bipolar type F25.0 ; Impulse 
disorder, unspecified F63.9 ; Attention deficit hyperactivity disorder (ADHD), 
inattentive type, mild F90.0 and Social anxiety disorder F40.10

 

 [place] [address], [place] [address]-
5210  12 Oct, 2017  Schizoaffective disorder, bipolar type F25.0 ; Impulse 
disorder, unspecified F63.9 ; Attention deficit hyperactivity disorder (ADHD), 
inattentive type, mild F90.0 and Social anxiety disorder F40.10

 

 [place] [address] [address]-
9028  22 Sep, 2017   

 

 [facility]  [id number] [place] [address] 06737-
4822  08 Sep, 2017  Schizoaffective disorder, bipolar type F25.0

 

 [facility]  [address] [place] [address] 01711-
8060  07 Sep, 2017  Other long term (current) drug therapy Z79.899

 

 [facility]  [address] [place] [address] 48100-
6301  01 Sep, 2017  Schizoaffective disorder, bipolar type F25.0 ; Impulse 
disorder, unspecified F63.9 ; Attention deficit hyperactivity disorder (ADHD), 
predominantly inattentive type F90.0 ; Anxiety F41.9 and Other long term (
current) drug therapy Z79.899

 

 [facility]  [id number] [place] [address] 11567-
1501  11 Aug, 2017   

 

 [facility]  [id number] [place] [address] 84028-
0612    Hypercholesterolemia with hyperglyceridemia E78.2

 

 [facility]  [id number] [place] [address] 96367-
3618    Schizoaffective disorder, bipolar type F25.0 and Impulse 
disorder, unspecified F63.9

 

 [facility]  [id number] [place] [address] 40819-
5916    Hypercholesterolemia with hyperglyceridemia E78.2 ; Impulse 
disorder, unspecified F63.9 ; Bipolar disorder, current episode mixed, 
unspecified F31.60 ; Schizoaffective disorder, bipolar type F25.0 and ADD (
attention deficit disorder) F90.0

 

 [facility]  [address] [address]-
4254  17 Mar, 2017   

 

 [facility]  [id number] [place] [address] 67693-
6413  13 Mar, 2017   

 

 [facility]  [id number] [place] [address] 16611-
3739  08 Mar, 2017  Schizoaffective disorder, bipolar type F25.0

 

 [facility]  [id number] [place] [address] 67406-
5007  06 Mar, 2017   

 

 [facility]  [id number] [place] [address] 41717-
1645  06 Mar, 2017   

 

 [facility]  [id number] [address]-
8487  02 Mar, 2017  Schizoaffective disorder, bipolar type F25.0

 

 [facility]  [id number] [place] [address] 41523-
0021  02 Mar, 2017  Schizoaffective disorder, bipolar type F25.0

 

 [facility]  [address] [address]-
3917  10 Nov, 2016   

 

 [facility]  [id number] [place] [address] 90389-
6288     

 

 [facility]  [address] [place] [address] 68047-
2978    Schizoaffective disorder, bipolar type F25.0 and Other long 
term (current) drug therapy Z79.899

 

 [facility]  [id number] [place] [address] 04489-
1703    ADD (attention deficit disorder) F90.0

 

 [facility]  [id number] [place] [address] 02282-
1355    ADD (attention deficit disorder) F90.0

 

 [facility]  [id number] [place] [address] 71915-
3325    Schizoaffective disorder, bipolar type F25.0 and ADD (
attention deficit disorder) F90.0

 

 [facility]  [address] [address]-
4405     

 

 [facility]  [address] [address]-
5644  27 Oct, 2015   

 

 [facility]  [id number] [place] [address] 41095-
7771  09 Oct, 2015  Encounter for long-term (current) use of other medications 
Z79.899

 

 [facility]  [address] [address]-
8924  07 Oct, 2015  Schizoaffective disorder, bipolar type F25.0 ; Anxiety 
disorder, unspecified F41.9 ; Impulse disorder, unspecified F63.9 and Encounter 
for long-term (current) use of other medications Z79.899

 

 [facility]  [id number] [place] [address] 82252-
9921  26 Aug, 2015   

 

 [facility]  [id number] [place] [address] 00842-
8789  26 Aug, 2015   

 

 [facility]  [id number] [place] [address] 13561-
0398     

 

 [facility]  [id number] [place] [address] 01825-
5719     

 

 [facility]  [id number] [place] [address] 98680-
6685  12 May, 2015   

 

 [facility]  [id number] [place] [address] 88590-
9336     

 

 [facility]  [id number] [place] [address] 84819-
7639     

 

 [facility]  [id number] [place] [address] 33207-
6265  04 Mar, 2015   

 

 [facility]  [address] [place] [address] 69288-
0753  04 Mar, 2015   

 

 [facility]  [address] [place] [address] 04719-
9274     

 

 [facility]  [address] [place] [address] 68276-
0552     

 

 [facility]  [address] [address]-
3357     

 

 [facility]  [id number] [address]-
0717     

 

 [facility]  [address] [address]-
8181     

 

 [facility]  [id number] [place] [address] 80796-
0339     

 

 [facility]  [address] [id number] [place] [address]-
8653     

 

 [facility] [place] [facility]  [address] [id number] [place] [address]-
6528     

 

 [facility] [place] [facility]  [address] [facility] [id number] [place] [address]-
7009     

 

 [facility] [place] [facility]  [address] [id number] [place] [address]-
9412     

 

 [facility] [place] [facility]  [address] [id number] [place]
0997  22 Dec, 2014   

 

 [facility] [place] [facility]  [address] [id number] [place] [address]-
8814  22 Dec, 2014   

 

 [facility] [place] [facility]  [address] [facility] [id number] [place] [address]-
0264  02 Dec, 2014   

 

 [facility] [place] [facility]  [address] [facility] [id number] [place] [address]-
4311  02 Dec, 2014   

 

 [facility] [place] [facility]  [id number] [place] [address]-
3669  01 Dec, 2014   

 

 [facility] [place] [facility]  [address] [facility] [id number] [place] [address]-
4800  01 Dec, 2014   

 

 [facility] [place] [facility]  [address] [id number] [place] [address]-
7125     

 

 [facility] [place] [facility]  [address] [id number] [address]-
1482     

 

 [facility] [place] [facility]  [address] [id number] [place] [address]-
2619     

 

 [facility] [place] [facility]  [address] [facility] [id number] [place] [address]-
4028     

 

 [facility] [place] [facility]  [address] [facility] [id number] [place] [address]-
1631  30 Oct, 2014   

 

 [facility] [place] [facility]  [address] [id number] [place] [address]-
4086  30 Oct, 2014   

 

 [facility] [place] [facility]  [address] [id number] [place]
8020  27 Oct, 2014   

 

 [facility] [place] [facility]  [address] [id number] [place]-
9050  27 Oct,    

 

 [facility] [place] [facility]  [address] [id number] [place] [address]-
9165  06 Oct, 2014   

 

 [facility] [place] [facility]  [address] [id number] [place] [address]-
7726  06 Oct, 2014   

 

 [facility] [place] [facility]  [address] [id number] [place] [address]-
5035  03 Oct, 2014   

 

 [facility] [place] [facility]  [address] [id number] [place]-
6279  03 Oct, 2014   

 

 [facility] [place] [facility]  [address] [id number] [place] [address]-
3116  05 Sep,    

 

 [facility] [place] [facility]  [address] [id number] [place] [address]-
5202  05 Sep,    

 

 [facility] [place] [facility]  [address] [id number] [place] [address]-
0800  05 Sep,    

 

 [facility] [place] [facility]  [address] [id number] [place] [address]-
3594  05 Sep,    

 

 [facility] [place] [facility]  [address] [id number] [place] [address]-
6887  04 Aug, 2014   

 

 [facility] [place] [facility]  [address] [id number] [place] [address]-
6273  04 Aug, 2014   

 

 [facility] [place] [facility]  [address] [id number] [place] [address]-
5252     

 

 [facility] [place] [facility]  [address] [id number] [place] [address]-
7392     

 

 [facility] [place] [facility]  [address] [id number] [place] [address]-
4036     

 

 [facility] [place] [facility]  [address] [id number] [place] [address]-
3922     

 

 [facility] [place] [facility]  [address] [id number] [place] [address]-
9640     

 

 [facility] [place] [facility]  [address] [id number] [place] [address]-
3263     

 

 [facility] [place] [facility]  [address] [id number] [place] [address]-
1154     

 

 [facility] [place] [facility]  [address] [id number] [place] [address]-
6330     

 

 [facility] [place] [facility]  [address] [id number] [place] [address]-
5383     

 

 [facility] [place] [facility]  [address] [id number] [place] [address]-
2188     

 

 [facility] [place] [facility]  [address] [id number] [place] [address]-
0292  09 May, 2014   

 

 [facility] [place] [facility]  [address] [id number] [place] [address]-
2621  09 May, 2014   

 

 [facility] [place] [facility]  [address] [id number] [place] [address]-
0950     

 

 [facility] [place] [facility]  [address] [id number] [address]-
2436     

 

 [facility] [place] [facility]  [address] [id number][place] [address]-
7489     

 

 [facility] [place] [facility]  [address] [id number] [place] [address]-
1327     

 

 [facility] [place] [facility]  [address] [id number] [place] [address]-
6211     

 

 [facility] [place] [facility]  [address] [id number] [place] [address]-
7788  06 Mar, 2014   

 

 [facility] [place] [facility]  [address] [id number] [place] [address]-
9758  06 Mar, 2014   

 

 [facility]  [address] [id number] [place] [address]-
7286     

 

 [facility]  [address] [id number] [place] [address]-
1026     

 

 [facility]  [address] [id number] [place] [address]-
1108  10 Chidi, 2014   

 

 [facility]  [address] [id number] [place] [address]-
6760  10 Chidi, 2014   

 

 [facility]  [address] [id number] [place] [address]-
8303  23 Dec, 2013   

 

 [facility]  [address] [id number] [place] [address]-
7047  23 Dec, 2013   

 

 [facility]  [address] [id number] [place] [address]-
3695  20 Dec, 2013   

 

 [facility]  [address] [id number] [place] [address]-
2841  20 Dec, 2013   

 

 [facility]  [address] [facility] [id number] [place] [address]-
7927  19 Dec, 2013   

 

 [facility]  [address] [facility] [id number] [place] [address]-
9639  05 Dec, 2013   

 

 [facility]  [address] [id number] [place] [address]-
2059  05 Dec, 2013   

 

 [facility]  [address] [id number] [place] [address]-
0553  03 Dec, 2013   

 

 [facility] [place] [facility]  [address] [id number] [place] [address]-
9664  03 Dec, 2013   

 

 [facility]  [address] [id number] [place] [address]-
2546  18 Sep, 2013   

 

 [facility]  [address] [id number] [place] [address]-
7201     

 

 [facility]  [address] [id number] [place] [address]-
4896     

 

 [facility] [place] [facility]  [address] [id number] [place] [address]-
5836     

 

 [facility] [place] [facility]  [address] [id number] [place] [address]-
4756  09 May, 2013   

 

 [facility] [place] [facility]  [address] [id number] [place] [address]-
2546     

 

 [facility]  [address] [id number] [place] [address]-
0406  18 Mar, 2013   

 

 [facility] [place] [facility]  [address] [id number] [place] [address]-
6756  07 Mar, 2013   

 

 [facility]  [address] [id number] [place] [address]-
2546  05 Mar, 2013   

 

 [facility]  [address] [id number] [place] [address]-
7906     

 

 [facility] [place] [facility]  [address] [id number] [place] [address]-
1266     

 

 [facility]  [address] [facility] [id number] [place] [address]-
1026     

 

 [facility] [place] [facility]  [address] [id number] [place] [address]-
2546     

 

 [facility] [place] [facility]  [address] [id number] [address]-
9756  18 Dec, 2012   

 

 [facility] [place] [facility]  [address] [id number] [place] [address]-
3316  18 Dec, 2012   

 

 [facility] [place] [facility]  [address] [id number] [place] [address]-
9676  06 Dec, 2012   

 

 [facility] [place] [facility]  [address] [id number] [place] [address]-
2546  04 Dec, 2012   

 

 [facility] [place] [facility]  [address] [id number] [place] [address]-
1756  04 Dec, 2012   

 

 [facility] [place] [facility]  [address] [id number] [place] [address]-
1236     

 

 [facility] [place] [facility]  [address] [id number] [place] [address]-
0178     

 

 [facility] [place] [facility]  [address] [id number] [place] [address]-
0441  31 Oct, 2012   

 

 [facility] [place] [facility]  [address] [id number] [place] [address]-
9467  31 Oct, 2012   

 

 [facility] [place] [facility]  [address] [id number] [place] [address]-
6637  26 Oct, 2012   

 

 [facility] [place] [facility]  [address] [id number] [place] [address]-
7490  26 Oct, 2012   

 

 [facility] [place] [facility]  [address] [id number] [place] [address]-
6208  23 Oct, 2012   

 

 [facility] [place] [facility]  [address] [id number] [place] [address]-
6842  23 Oct, 2012   

 

 [facility] [place] [facility]  [address] [id number] [place] [address]-
4269  18 Sep, 2012   

 

 [facility] [place] [facility]  [address] [id number] [place] [address]-
5849  23 Aug, 2012   

 

 [facility] [place] [facility]  [address] [id number] [place] [address]-
6360  22 Aug, 2012   

 

 [facility] [place] [facility]  [address] [id number] [place] [address]-
4289     

 

 [facility] [place] [facility]  [address] [id number] [place] [address]-
4677     

 

 [facility] [place] [facility]  [address] [id number] [place] [address]-
2819     

 

 [facility] [place] [facility]  [address] [facility] [id number] [place] [address]-
5520     

 

 [facility] [place] [facility]  [address] [id number] [place] [address]-
5777  01 May, 2012   

 

 [facility] [place] [facility]  [address] [id number] [place] [address]-
9532  27 2012   

 

 [facility] [place] [facility]  [address] [id number] [place] [address]-
4626  10 Apr, 2012   

 

 [facility] [place] [facility]  [address] [id number] [place] [address]-
5909  14 Mar, 2012   

 

 [facility] [place] [facility]  [address] [id number] [place] [address]-
8919  31 2012   

 

 [facility] [place] [facility]  [address] [id number] [place] [address]-
5264     

 

 [facility] [place] [facility]  [address] [id number] [place] [address]-
6915  26 Dec, 2011   

 

 [facility] [place] [facility]  [address] [id number] [place] [address]-
7669  21 Dec, 2011   

 

 [facility] [place] [facility]  [address] [id number] [place] [address]-
7329  19 Dec, 2011   

 

 [facility] [place] [facility]  [address] [id number] [place] [address]-
3655  16 Dec, 2011   

 

 [facility] [place] [facility]  [address] [id number] [place]
5159  16 Dec, 2011   

 

 [facility] [place] [facility]  [address] [id number] [place] [address]-
2977  14 Dec, 2011   

 

 [facility] [place] [facility]  [address] [id number] [place] [address]-
4254  14 Dec, 2011   

 

 [facility] [place] [facility]  [address] [id number] [place] [address]-
1496  17 2011   

 

 [facility] [place] [facility]  [address] [id number] [place] [address]-
9006  17 2011   

 

 [facility] [place] [facility]  [address] [id number] [place] [address]-
5537  18 Oct, 2011   

 

 [facility]  [address] [facility] [id number] [place] [address]-
0706  10 Dec, 2010   

 

 [facility]  [address] [facility] [id number] [address]-
0126     

 

 [facility]  [address] [facility] [id number] [address]
2546     

 

 [facility]  [address] [facility] [id number] [address]-
9446  26 Oct, 2010   







IMMUNIZATIONS

No Known Immunizations



SOCIAL HISTORY

Never Assessed



REASON FOR VISIT

 f/u--OLIVIER Jaquez MA



PLAN OF CARE







 Activity  Details









  









 Follow Up  2 Weeks Reason: f/u







VITAL SIGNS







 Height  65 in  2017

 

 Weight  187.7 lbs  2017

 

 Heart Rate  72 bpm  2017

 

 Respiratory Rate  20   2017

 

 BMI  31.23 kg/m2  2017

 

 Blood pressure systolic  114 mmHg  2017

 

 Blood pressure diastolic  70 mmHg  2017







MEDICATIONS







 Medication  Instructions  Dosage  Frequency  Start Date  End Date  Duration  
Status

 

 Latuda 40 mg  Orally at suppertime  1 tablet with food              Active

 

 Zoloft 50 mg  Orally Once a day  1 tablet  24h          Active

 

 Lithium Carbonate 300 MG  Orally twice a day  2 capsules  12h           Active







RESULTS

No Results



PROCEDURES

No Known procedures



INSTRUCTIONS





MEDICATIONS ADMINISTERED

No Known Medications



MEDICAL (GENERAL) HISTORY







 Type  Description  Date

 

 Medical History  depression   

 

 Medical History  adhd   

 

 Medical History  bipolar   

 

 Medical History  hyperlipidemia   

 

 Medical History  Hx of heart valve dysfunction but says this has resolved   

 

 Medical History  lactose intolerant   

 

 Hospitalization History  [facility] x4, last incident at age 9

## 2018-10-30 NOTE — ED GU-MALE
General


Chief Complaint:  Rect Problems


Stated Complaint:  DIZZY;RECTAL BLEEDING


Nursing Triage Note:  


PTA WAS SITTING ON BATHROOM STOOL TO HAVE BM AND REPORTS PASSED A LG AMOUNT OF 


BLOOD IN STOOL. FELT LIKE HE WAS  GOING T PASS OUT AFTER. CALLED 911 FOR EMS 

BUT 


REFUSED TRANSPORT AFTER ARRIVAL





History of Present Illness


Date Seen by Provider:  Oct 30, 2018


Time Seen by Provider:  17:25


Initial Comments


23-year-old  male reports diarrhea for 3-4 days. Just prior to arrival 

he reports a significant passage of stool and noted bright red blood mixed in. 

He denies previous history of rectal bleeding or hemorrhoids. Upon rising from 

the toilet jassing had a syncopal episode. The symptoms have since improved.


Timing/Duration:  this afternoon


Location:  other (rectal)


Radiation:  none


Activities at Onset:  other (diarrhea)


Prior Genitourinary Problems:  none


Associated Symptoms:  No abdominal pain, No diaphoresis, No dysuria, No fever/

chills, No loss of bladder control, No lower back pain, No lumps, No mass, No 

nausea/vomiting, No nocturia, No polyuria, No swelling; syncope; No urinary 

frequency





Allergies and Home Medications


Allergies


Coded Allergies:  


     ciprofloxacin (Verified  Allergy, Unknown, 10/30/18)


Uncoded Allergies:  


     ADHD MEDS (Allergy, Unknown, 10/30/18)





Home Medications


Diphenoxylate HCl/Atropine 1 Each Tablet, 1 EACH PO Q6H


   Prescribed by: GEORGINA MOURA on 10/30/18 1815





Patient Home Medication List


Home Medication List Reviewed:  Yes





Review of Systems


Review of Systems


Constitutional:  no symptoms reported, see HPI


Gastrointestinal:  see HPI, diarrhea, melena





All Other Systemes Reviewed


Negative Unless Noted:  Yes





Past Medical-Social-Family Hx


Patient Social History


Alcohol Use:  Denies Use


Recreational Drug Use:  No


Smoking Status:  Never a Smoker


Recent Foreign Travel:  No


Contact w/Someone Who Travel:  No


Recent Infectious Disease Expo:  No





Past Medical History


Surgeries:  No


Respiratory:  No


Cardiac:  No


Neurological:  No


Genitourinary:  No


Musculoskeletal:  No


Endocrine:  No


Integumentary:  No





Physical Exam


Vital Signs





Vital Signs - First Documented








 10/30/18 10/30/18





 17:17 18:26


 


Temp 98.9 


 


Pulse 71 


 


Resp 18 


 


B/P (MAP) 126/88 (101) 


 


Pulse Ox  98


 


O2 Delivery  Room Air





Capillary Refill : Less Than 3 Seconds


Height, Weight, BMI


Height: 5'4.00"


Weight: 175lbs. oz. 79.863237gw;  BMI


Method:Stated


General Appearance:  WD/WN, no apparent distress


HEENT:  PERRL/EOMI, normal ENT inspection, TMs normal, pharynx normal


Neck:  non-tender, full range of motion, supple, normal inspection


Cardiovascular:  normal peripheral pulses, regular rate, rhythm


Respiratory:  chest non-tender


Gastrointestinal:  normal bowel sounds, non tender; No distended, No guarding, 

No rebound, No tenderness


Rectal:  normal exam, normal rectal tone; No hemorrhoids, No tenderness; other (

no stool present for testing)


Neurologic/Psychiatric:  no motor/sensory deficits, alert, normal mood/affect, 

oriented x 3


Skin:  normal color, warm/dry





Progress/Results/Core Measures


Suspected Sepsis


Recent Fever Within 48 Hours:  No


Infection Criteria Present:  None


New/Unexplained  Altered Menta:  No


Sepsis Screen:  No Definite Risk


SIRS


Temperature:98.9 


Pulse: 71 


Respiratory Rate: 18


 


Laboratory Tests


10/30/18 17:36: White Blood Count 7.3


Blood Pressure 126 /88 


Mean: 101


 


Laboratory Tests


10/30/18 17:36: 


Creatinine 0.90, Platelet Count 247, Total Bilirubin 0.3








Results/Orders


Lab Results





Laboratory Tests








Test


 10/30/18


17:36 Range/Units


 


 


White Blood Count


 7.3 


 4.3-11.0


10^3/uL


 


Red Blood Count


 5.26 


 4.35-5.85


10^6/uL


 


Hemoglobin 15.1  13.3-17.7  G/DL


 


Hematocrit 43  40-54  %


 


Mean Corpuscular Volume 81  80-99  FL


 


Mean Corpuscular Hemoglobin 29  25-34  PG


 


Mean Corpuscular Hemoglobin


Concent 35 


 32-36  G/DL





 


Red Cell Distribution Width 12.3  10.0-14.5  %


 


Platelet Count


 247 


 130-400


10^3/uL


 


Mean Platelet Volume 9.7  7.4-10.4  FL


 


Neutrophils (%) (Auto) 68  42-75  %


 


Lymphocytes (%) (Auto) 23  12-44  %


 


Monocytes (%) (Auto) 8  0-12  %


 


Eosinophils (%) (Auto) 1  0-10  %


 


Basophils (%) (Auto) 0  0-10  %


 


Neutrophils # (Auto) 4.9  1.8-7.8  X 10^3


 


Lymphocytes # (Auto) 1.7  1.0-4.0  X 10^3


 


Monocytes # (Auto) 0.6  0.0-1.0  X 10^3


 


Eosinophils # (Auto)


 0.1 


 0.0-0.3


10^3/uL


 


Basophils # (Auto)


 0.0 


 0.0-0.1


10^3/uL


 


Sodium Level 139  135-145  MMOL/L


 


Potassium Level 3.8  3.6-5.0  MMOL/L


 


Chloride Level 108 H   MMOL/L


 


Carbon Dioxide Level 23  21-32  MMOL/L


 


Anion Gap 8  5-14  MMOL/L


 


Blood Urea Nitrogen 14  7-18  MG/DL


 


Creatinine


 0.90 


 0.60-1.30


MG/DL


 


Estimat Glomerular Filtration


Rate > 60 


  





 


BUN/Creatinine Ratio 16   


 


Glucose Level 95    MG/DL


 


Calcium Level 9.5  8.5-10.1  MG/DL


 


Corrected Calcium   8.5-10.1  MG/DL


 


Total Bilirubin 0.3  0.1-1.0  MG/DL


 


Aspartate Amino Transf


(AST/SGOT) 29 


 5-34  U/L





 


Alanine Aminotransferase


(ALT/SGPT) 49 


 0-55  U/L





 


Alkaline Phosphatase 90    U/L


 


Total Protein 7.4  6.4-8.2  GM/DL


 


Albumin 4.6 H 3.2-4.5  GM/DL








My Orders





Orders - GEORGINA MOURA


Cbc With Automated Diff (10/30/18 17:21)


Comprehensive Metabolic Panel (10/30/18 17:21)





Vital Signs/I&O











 10/30/18 10/30/18





 17:17 18:26


 


Temp 98.9 


 


Pulse 71 85


 


Resp 18 18


 


B/P (MAP) 126/88 (101) 127/83 (98)


 


Pulse Ox  98


 


O2 Delivery  Room Air





Capillary Refill : Less Than 3 Seconds








Blood Pressure Mean:  101











Departure


Impression





 Primary Impression:  


 Diarrhea


 Qualified Codes:  R19.7 - Diarrhea, unspecified


 Additional Impression:  


 Bloody stool


Disposition:  01 HOME, SELF-CARE


Condition:  Improved





Departure-Patient Inst.


Decision time for Depature:  16:00


Referrals:  


JENNY ARELLANO MD (PCP)


Primary Care Physician


Patient Instructions:  Bloody Stools, Adult (DC)





Add. Discharge Instructions:  


Clear liquid diet for the next 4-6 hours. (pedialyte and sprite encouraged)


Take the Lomotil as needed for diarrhea.


Follow-up at [facility] if continued rectal bleeding.


Return to emergency department for new, urgent health care problems.





All discharge instructions reviewed with patient and/or family. Voiced 

understanding.


Scripts


Diphenoxylate HCl/Atropine (Lomotil 2.5-0.025 mg Tablet) 1 Each Tablet


1 EACH PO Q6H, #6 TAB 0 Refills


   Prov: GEORGINA MOURA         10/30/18











GEORGINA MOURA Oct 30, 2018 18:15

## 2018-10-30 NOTE — XMS REPORT
[facility]

 Created on: 2018



Oliver Owens

External Reference #: [id number]

: 1995

Sex: Male



Demographics







 Address  [address] [place]  [address]

 

 Preferred Language  Unknown

 

 Marital Status  Unknown

 

 Holiness Affiliation  Unknown

 

 Race  Unknown

 

 Ethnic Group  Unknown





Author







 Author  RHONA  PATTY

 

 [facility]

 

 Address  [address] [place]  [address]



 

 Phone  [phone]







Care Team Providers







 Care Team Member Name  Role  Phone

 

 RHONA,  PATTY  Unavailable  [phone]







PROBLEMS







 Type  Condition  ICD9-CM Code  GPK83-ES Code  Onset Dates  Condition Status  
SNOMED Code

 

 Problem  ADD (attention deficit disorder)     F90.0     Active  636539793

 

 Problem  Hypercholesterolemia with hyperglyceridemia     E78.2     Active  
[id number]

 

 Problem  Schizoaffective disorder, bipolar type     F25.0     Active  38215912

 

 Problem  Gastroesophageal reflux disease, esophagitis presence not specified  
   K21.9     Active  281917857

 

 Problem  Social anxiety disorder     F40.10     Active  72893599

 

 Problem  Bipolar disorder, current episode mixed, unspecified     F31.60     
Active  25302080

 

 Problem  Impulse disorder, unspecified     F63.9     Active  39725771

 

 Problem  Attention deficit hyperactivity disorder (ADHD), predominantly 
inattentive type     F90.0     Active  84695519

 

 Problem  Anxiety     F41.9     Active  91765911







ALLERGIES







 Substance  Reaction  Event Type  Date  Status

 

 Lactose  Unknown  Drug Allergy    Active

 

 Cipro  hives  Drug Allergy    Active







ENCOUNTERS







 Encounter  Location  Date  Diagnosis

 

 [place] [id number] [place] [address]11-
4681    Gastroesophageal reflux disease, esophagitis presence not 
specified K21.9

 

 [facility] [place]  [id number] [place] [address] 36146-
9681    Schizoaffective disorder, bipolar type F25.0

 

 [facility] [place]  [id number] [place] [address] 44497-
7062    Schizoaffective disorder, bipolar type F25.0 ; Impulse 
disorder, unspecified F63.9 ; Attention deficit hyperactivity disorder (ADHD), 
inattentive type, mild F90.0 and Social anxiety disorder F40.10

 

 [facility] [place]  [id number], [place] [address]
2184    Schizoaffective disorder, bipolar type F25.0 ; Impulse 
disorder, unspecified F63.9 ; Attention deficit hyperactivity disorder (ADHD), 
inattentive type, mild F90.0 and Social anxiety disorder F40.10

 

 [facility] [place]  [address] [place] [address] 62589-
8501  13 Dec, 2017  Schizoaffective disorder, bipolar type F25.0 ; Impulse 
disorder, unspecified F63.9 ; Attention deficit hyperactivity disorder (ADHD), 
inattentive type, mild F90.0 and Social anxiety disorder F40.10

 

 [place] [id number] [place] [address] 42002-
0914  11 Dec, 2017   

 

 [place] [id number] [place] [address] 89410-
7021    Schizoaffective disorder, bipolar type F25.0

 

 [place] [id number] [place] [address] 96546-
0777    Schizoaffective disorder, bipolar type F25.0 ; Impulse 
disorder, unspecified F63.9 ; Attention deficit hyperactivity disorder (ADHD), 
inattentive type, mild F90.0 and Social anxiety disorder F40.10

 

 [place] [address], [place] [address]-
2665  12 Oct, 2017  Schizoaffective disorder, bipolar type F25.0 ; Impulse 
disorder, unspecified F63.9 ; Attention deficit hyperactivity disorder (ADHD), 
inattentive type, mild F90.0 and Social anxiety disorder F40.10

 

 [place] [address], [place] [address]-
5822  22 Sep, 2017   

 

 [facility] [place]  [id number] [place] [address] 02378-
7962  08 Sep, 2017  Schizoaffective disorder, bipolar type F25.0

 

 [facility] [place]  [address] [place] [address] 59398-
4299  07 Sep, 2017  Other long term (current) drug therapy Z79.899

 

 [facility] [place]  [address] [place] [address] 71734-
1799  01 Sep, 2017  Schizoaffective disorder, bipolar type F25.0 ; Impulse 
disorder, unspecified F63.9 ; Attention deficit hyperactivity disorder (ADHD), 
predominantly inattentive type F90.0 ; Anxiety F41.9 and Other long term (
current) drug therapy Z79.899

 

 [facility] [place]  [id number] [place] [address] 09556-
2217  11 Aug, 2017   

 

 [facility] [place]  [id number] [place] [address] 69389-
0724    Hypercholesterolemia with hyperglyceridemia E78.2

 

 [facility] [place]  [id number] [place] [address] 06587-
1109    Schizoaffective disorder, bipolar type F25.0 and Impulse 
disorder, unspecified F63.9

 

 [facility] [place]  [id number] [place] [address] 76931-
0395    Hypercholesterolemia with hyperglyceridemia E78.2 ; Impulse 
disorder, unspecified F63.9 ; Bipolar disorder, current episode mixed, 
unspecified F31.60 ; Schizoaffective disorder, bipolar type F25.0 and ADD (
attention deficit disorder) F90.0

 

 [facility] [place]  [address] [address]-
5898  17 Mar, 2017   

 

 [facility] [place]  [id number] [place] [address] 46337-
5774  13 Mar, 2017   

 

 [facility] [place]  [id number] [place] [address] 15679-
1836  08 Mar, 2017  Schizoaffective disorder, bipolar type F25.0

 

 [facility] [place]  [id number] [place] [address] 28061-
2723  06 Mar, 2017   

 

 [facility] [place]  [id number] [place] [address] 41449-
1417  06 Mar, 2017   

 

 [facility] [place]  [id number] [address]-
4175  02 Mar, 2017  Schizoaffective disorder, bipolar type F25.0

 

 [facility] [place]  [id number] [place] [address] 20894-
6659  02 Mar, 2017  Schizoaffective disorder, bipolar type F25.0

 

 [facility] [place]  [address] [address]-
9020  10 Nov, 2016   

 

 [facility] [place]  [id number] [place] [address] 71136-
7221     

 

 [facility] [place]  [address] [place] [address] 62450-
2016    Schizoaffective disorder, bipolar type F25.0 and Other long 
term (current) drug therapy Z79.899

 

 [facility] [place]  [id number] [place] [address] 67585-
0687    ADD (attention deficit disorder) F90.0

 

 [facility] [place]  [id number] [place] [address] 79765-
7824    ADD (attention deficit disorder) F90.0

 

 [facility] [place]  [id number] [place] [address] 83450-
2142    Schizoaffective disorder, bipolar type F25.0 and ADD (
attention deficit disorder) F90.0

 

 [facility] [place]  [address] [address]-
6495     

 

 [facility] [place]  [address] [address]-
7991  27 Oct, 2015   

 

 [facility] [place]  [id number] [place] [address] 75815-
9909  09 Oct, 2015  Encounter for long-term (current) use of other medications 
Z79.899

 

 [facility] [place]  [address] [address]-
7366  07 Oct, 2015  Schizoaffective disorder, bipolar type F25.0 ; Anxiety 
disorder, unspecified F41.9 ; Impulse disorder, unspecified F63.9 and Encounter 
for long-term (current) use of other medications Z79.899

 

 [facility] [place]  [id number] [place] [address] 84948-
6241  26 Aug, 2015   

 

 [facility] [place]  [id number] [place] [address] 09918-
5729  26 Aug, 2015   

 

 [facility] [place]  [id number] [place] [address] 48194-
5789     

 

 [facility]  [id number] [place] [address] 09065-
8331     

 

 [facility] [place]  [id number] [place] [address] 55992-
0459  12 May, 2015   

 

 [facility] [place]  [id number] [place] [address] 90798-
2704     

 

 [facility] [place]  [id number] [place] [address] 99138-
1222     

 

 [facility] [place]  [id number] [place] [address] 76480-
3477  04 Mar, 2015   

 

 [facility] [place]  [address] [place] [address] 81641-
0143  04 Mar, 2015   

 

 [facility] [place]  [address] [place] [address] 73329-
3038     

 

 [facility] [place]  [address] [place] [address] 36918-
5586     

 

 [facility] [place]  [address], [place] [address]-
4943     

 

 [facility] [place]  [id number] [address]-
9978     

 

 [facility] [place]  [address] [address]-
3770     

 

 [facility] [place]  [id number] [place] [address] 90128-
6315     

 

 [facility]  [address] [id number] [place] [address]-
9069     

 

 [facility] [place] [facility]  [address] [id number] [place] [address]-
5493     

 

 [facility] [place] [facility]  [address] [facility] [id number] [place] [address]-
6036     

 

 [facility] [place] [facility]  [address] [id number] [place] [address]-
4098     

 

 [facility] [place] [facility]  [address] [id number] [place]-
6602  22 Dec, 2014   

 

 [facility] [place] [facility]  [address] [id number] [place] [address]-
0474  22 Dec, 2014   

 

 [facility] [place] [facility]  [address] [facility] [id number] [place] [address]-
7535  02 Dec, 2014   

 

 [facility] [place] [facility]  [address] [facility] [id number] [place] [address]-
8619  02 Dec, 2014   

 

 [facility] [place] [facility]  [id number] [address]-
7208  01 Dec, 2014   

 

 [facility] [place] [facility]  [address] [facility] [id number] [place] [address]-
0777  01 Dec, 2014   

 

 [facility] [place] [facility]  [address] [id number] [place] [address]-
8659     

 

 [facility] [place] [facility]  [address] [id number] [address]-
3647     

 

 [facility] [place] [facility]  [address] [id number] [place] [address]-
0056     

 

 [facility] [place] [facility]  [address] [facility] [id number] [place] [address]-
1541     

 

 [facility] [place] [facility]  [address] [facility] [id number] [place] [address]-
5736  30 Oct, 2014   

 

 [facility] [place] [facility]  [address] [id number] [place] [address]-
3933  30 Oct, 2014   

 

 [facility] [place] [facility]  [address] [id number] [place]
7622  27 Oct, 2014   

 

 [facility] [place] [facility]  [address] [id number] [place]-
5644  27 Oct,    

 

 [facility] [place] [facility]  [address] [id number] [place] [address]-
6016  06 Oct, 2014   

 

 [facility] [place] [facility]  [address] [id number] [place] [address]-
7331  06 Oct, 2014   

 

 [facility] [place] [facility]  [address] [id number] [place] [address]-
7351  03 Oct, 2014   

 

 [facility] [place] [facility]  [address] [id number] [place]
7975  03 Oct, 2014   

 

 [facility] [place] [facility]  [address] [id number] [place] [address]-
8353  05 Sep,    

 

 [facility] [place] [facility]  [address] [id number] [place] [address]-
7045  05 Sep,    

 

 [facility] [place] [facility]  [address] [id number] [place] [address]-
7150  05 Sep,    

 

 [facility] [place] [facility]  [address] [id number] [place] [address]-
7904  05 Sep,    

 

 [facility] [place] [facility]  [address] [id number] [place] [address]-
0673  04 Aug, 2014   

 

 [facility] [place] [facility]  [address] [id number] [place] [address]-
3876  04 Aug, 2014   

 

 [facility] [place] [facility]  [address] [id number] [place] [address]-
1564     

 

 [facility] [place] [facility]  [address] [id number] [place] [address]-
8761     

 

 [facility] [place] [facility]  [address] [id number] [place] [address]-
8223     

 

 [facility] [place] [facility]  [address] [id number] [place] [address]-
0744     

 

 [facility] [place] [facility]  [address] [id number] [place] [address]-
4605     

 

 [facility] [place] [facility]  [address] [id number] [place] [address]-
2500     

 

 [facility] [place] [facility]  [address] [id number] [place] [address]-
1875     

 

 [facility] [place] [facility]  [address] [id number] [place] [address]-
9582     

 

 [facility] [place] [facility]  [address] [id number] [place] [address]-
0539     

 

 [facility] [place] [facility]  [address] [id number] [place] [address]-
6452     

 

 [facility] [place] [facility]  [address] [id number] [place] [address]-
7022  09 May, 2014   

 

 [facility] [place] [facility]  [address] [id number] [place] [address]-
7932  09 May, 2014   

 

 [facility] [place] [facility]  [address] [id number] [place] [address]-
4658     

 

 [facility] [place] [facility]  [address] [id number] [address]-
1083     

 

 [facility] [place] [facility]  [address] [id number] [address]-
3736     

 

 [facility] [place] [facility]  [address] [id number] [place] [address]-
0520     

 

 [facility] [place] [facility]  [address] [id number] [place] [address]-
0203     

 

 [facility] [place] [facility]  [address] [id number] [place] [address]-
6222  06 Mar, 2014   

 

 [facility] [place] [facility]  [address] [id number] [place] [address]-
7632  06 Mar, 2014   

 

 [facility]  [address] [id number] [place] [address]-
9216     

 

 [facility]  [address] [id number] [place] [address]-
4986     

 

 [facility]  [address] [id number] [place] [address]-
1904  10 Chidi, 2014   

 

 [facility]  [address] [id number] [place] [address]-
4651  10 Chidi, 2014   

 

 [facility]  [address] [id number] [place] [address]-
5778  23 Dec, 2013   

 

 [facility]  [address] [id number] [place] [address]-
5804  23 Dec, 2013   

 

 [facility]  [address] [id number] [place] [address]-
6212  20 Dec, 2013   

 

 [facility]  [address] [id number] [place] [address]-
8049  20 Dec, 2013   

 

 [facility]  [address] [facility] [id number] [place] [address]-
4552  19 Dec, 2013   

 

 [facility]  [address] [facility] [id number] [place] [address]-
7035  05 Dec, 2013   

 

 [facility]  [address] [id number] [place] [address]-
0638  05 Dec, 2013   

 

 [facility]  [address] [id number] [place] [address]-
4556  03 Dec, 2013   

 

 [facility] [place] [facility]  [address] [id number] [place] [address]-
5608  03 Dec, 2013   

 

 [facility]  [address] [id number] [place] [address]-
2546  18 Sep, 2013   

 

 [facility]  [address] [id number] [place] [address]-
4577     

 

 [facility]  [address] [id number] [place] [address]-
4086     

 

 [facility] [place] [facility]  [address] [id number] [place] [address]-
3846     

 

 [facility] [place] [facility]  [address] [id number] [place] [address]-
9716  09 May, 2013   

 

 [facility] [place] [facility]  [address] [id number] [place] [address]-
2546     

 

 [facility]  [address] [id number] [place] [address]-
3896  18 Mar, 2013   

 

 [facility] [place] [facility]  [address] [id number] [place] [address]-
4326  07 Mar, 2013   

 

 [facility]  [address] [id number] [place] [address]-
2546  05 Mar, 2013   

 

 [facility]  [address] [id number] [place] [address]-
4916     

 

 [facility] [place] [facility]  [address] [id number] [place] [address]-
9156     

 

 [facility]  [address] [facility] [id number] [place] [address]-
3716     

 

 [facility] [place] [facility]  [address] [id number] [place] [address]-
2546     

 

 [facility] [place] [facility]  [address] [id number] [address]-
0086  18 Dec, 2012   

 

 [facility] [place] [facility]  [address] [id number] [place] [address]-
4306  18 Dec, 2012   

 

 [facility] [place] [facility]  [address] [id number] [place] [address]-
8236  06 Dec, 2012   

 

 [facility] [place] [facility]  [address] [id number] [place] [address]-
2546  04 Dec, 2012   

 

 [facility] [place] [facility]  [address] [id number] [place] [address]-
6765  04 Dec, 2012   

 

 [facility] [place] [facility]  [address] [id number] [place] [address]-
5303     

 

 [facility] [place] [facility]  [address] [id number] [place] [address]-
2976     

 

 [facility] [place] [facility]  [address] [id number] [place] [address]-
8444  31 Oct, 2012   

 

 [facility] [place] [facility]  [address] [id number] [place] [address]-
1612  31 Oct, 2012   

 

 [facility] [place] [facility]  [address] [id number] [place] [address]-
1033  26 Oct, 2012   

 

 [facility] [place] [facility]  [address] [id number] [place] [address]-
7958  26 Oct, 2012   

 

 [facility] [place] [facility]  [address] [id number] [place] [address]-
3411  23 Oct, 2012   

 

 [facility] [place] [facility]  [address] [id number] [place] [address]-
3873  23 Oct, 2012   

 

 [facility] [place] [facility]  [address] [id number] [place] [address]-
0843  18 Sep, 2012   

 

 [facility] [place] [facility]  [address] [id number] [place] [address]-
1512  23 Aug, 2012   

 

 [facility] [place] [facility]  [address] [id number] [place] [address]-
7961  22 Aug, 2012   

 

 [facility] [place] [facility]  [address] [id number] [place] [address]-
9127     

 

 [facility] [place] [facility]  [address] [id number] [place] [address]-
7720     

 

 [facility] [place] [facility]  [address] [id number] [place] [address]-
0611     

 

 [facility] [place] [facility]  [address] [facility] [id number] [place] [address]-
7234     

 

 [facility] [place] [facility]  [address] [id number] [place] [address]-
9549  01 May, 2012   

 

 [facility] [place] [facility]  [address] [id number] [place] [address]-
5565  27 2012   

 

 [facility] [place] [facility]  [address] [id number] [place] [address]-
1326  10 Apr, 2012   

 

 [facility] [place] [facility]  [address] [id number] [place] [address]-
1853  14 Mar, 2012   

 

 [facility] [place] [facility]  [address] [id number] [place] [address]-
6757  31 2012   

 

 [facility] [place] [facility]  [address] [id number] [place] [address]-
7846     

 

 [facility] [place] [facility]  [address] [id number] [place] [address]-
9641  26 Dec, 2011   

 

 [facility] [place] [facility]  [address] [id number] [place] [address]-
6417  21 Dec, 2011   

 

 [facility] [place] [facility]  [address] [id number] [place] [address]-
3255  19 Dec, 2011   

 

 [facility] [place] [facility]  [address] [id number] [place] [address]-
2601  16 Dec, 2011   

 

 [facility] [place] [facility]  [address] [id number] [place] [id number]
[address]37  16 Dec, 2011   

 

 [facility] [place] [facility]  [address] [id number] [place] [address]-
5475  14 Dec, 2011   

 

 [facility] [place] [facility]  [address] [id number] [place] [address]-
9503  14 Dec, 2011   

 

 [facility] [place] [facility]  [address] [id number] [place] [address]-
7432  17 2011   

 

 [facility] [place] [facility]  [address] [id number] [place] [address]-
2236  17 2011   

 

 [facility] [place] [facility]  [address] [id number] [place] [address]-
2382  18 Oct, 2011   

 

 [facility] [place]  [address] [facility] [id number] [place] [address]-
4051  10 Dec, 2010   

 

 [facility] [place]  [address] [facility] [id number] [address]-
6566     

 

 [facility] [place]  [address] [facility] [id number], [place] [address]-
8686     

 

 [facility] [place]  [address] [facility] [id number][place] [address]-
8086  26 Oct, 2010   







IMMUNIZATIONS

No Known Immunizations



SOCIAL HISTORY

Never Assessed



REASON FOR VISIT

 f/u-Sarthak DAVIES



PLAN OF CARE







 Activity  Details









  









 Follow Up  2 Months Reason: f/u







VITAL SIGNS







 Height  65 in  2018

 

 Weight  189.0 lbs  2018

 

 Heart Rate  76 bpm  2018

 

 Respiratory Rate  18   2018

 

 BMI  31.45 kg/m2  2018

 

 Blood pressure systolic  122 mmHg  2018

 

 Blood pressure diastolic  68 mmHg  2018







MEDICATIONS







 Medication  Instructions  Dosage  Frequency  Start Date  End Date  Duration  
Status

 

 Zoloft 50 mg  Orally Once a day  1 tablet  24h  22 2017        Active

 

 Latuda 40 mg  Orally at suppertime  1 tablet with food              Active

 

 Lithium Carbonate 300 MG  Orally twice a day  2 capsules  12h        30 days  
Active







RESULTS

No Results



PROCEDURES

No Known procedures



INSTRUCTIONS





MEDICATIONS ADMINISTERED

No Known Medications



MEDICAL (GENERAL) HISTORY







 Type  Description  Date

 

 Medical History  depression   

 

 Medical History  adhd   

 

 Medical History  bipolar   

 

 Medical History  hyperlipidemia   

 

 Medical History  Hx of heart valve dysfunction but says this has resolved   

 

 Medical History  lactose intolerant   

 

 Hospitalization History  [facility] x4, last incident at age 9

## 2018-10-30 NOTE — XMS REPORT
[facility]

 Created on: 2018



Oliver Owens

External Reference #: [id number]

: 1995

Sex: Male



Demographics







 Address  [address] [place]  [address]

 

 Preferred Language  Unknown

 

 Marital Status  Unknown

 

 Episcopal Affiliation  Unknown

 

 Race  Unknown

 

 Ethnic Group  Unknown





Author







 Author  WIN Aldridge

 

 Organization  [facility]

 

 Address  [address]  [address]



 

 Phone  [phone]







Care Team Providers







 Care Team Member Name  Role  Phone

 

 WIN Aldridge  Unavailable  [phone]







PROBLEMS







 Type  Condition  ICD9-CM Code  LSR51-TY Code  Onset Dates  Condition Status  
SNOMED Code

 

 Problem  ADD (attention deficit disorder)     F90.0     Active  317125504

 

 Problem  Hypercholesterolemia with hyperglyceridemia     E78.2     Active  
[id number]

 

 Problem  Schizoaffective disorder, bipolar type     F25.0     Active  92100196

 

 Problem  Gastroesophageal reflux disease, esophagitis presence not specified  
   K21.9     Active  493371434

 

 Problem  Social anxiety disorder     F40.10     Active  42918347

 

 Problem  Bipolar disorder, current episode mixed, unspecified     F31.60     
Active  01415016

 

 Problem  Impulse disorder, unspecified     F63.9     Active  94595654

 

 Problem  Attention deficit hyperactivity disorder (ADHD), predominantly 
inattentive type     F90.0     Active  65583499

 

 Problem  Anxiety     F41.9     Active  12973489







ALLERGIES







 Substance  Reaction  Event Type  Date  Status

 

 Lactose  Unknown  Drug Allergy    Active

 

 Cipro  hives  Drug Allergy    Active







ENCOUNTERS







 Encounter  Location  Date  Diagnosis

 

 [place] [address] [place] [address]-
1153    Gastroesophageal reflux disease, esophagitis presence not 
specified K21.9

 

 [facility] [place]  [id number] [place] [address]-
1143    Schizoaffective disorder, bipolar type F25.0

 

 [facility] [place]  [address] [place] [address] 87605-
2912    Schizoaffective disorder, bipolar type F25.0 ; Impulse 
disorder, unspecified F63.9 ; Attention deficit hyperactivity disorder (ADHD), 
inattentive type, mild F90.0 and Social anxiety disorder F40.10

 

 [facility] [place]  [address] [address]
0764    Schizoaffective disorder, bipolar type F25.0 ; Impulse 
disorder, unspecified F63.9 ; Attention deficit hyperactivity disorder (ADHD), 
inattentive type, mild F90.0 and Social anxiety disorder F40.10

 

 [facility] [place]  [address] [address]-
9867  13 Dec, 2017  Schizoaffective disorder, bipolar type F25.0 ; Impulse 
disorder, unspecified F63.9 ; Attention deficit hyperactivity disorder (ADHD), 
inattentive type, mild F90.0 and Social anxiety disorder F40.10

 

 [facility] [place]  [address] [place] [address] 34647-
2707  11 Dec, 2017   

 

 [facility] [place]  [id number] [place] [address] 52842-
9588    Schizoaffective disorder, bipolar type F25.0

 

 [facility] [place]  [id number] [place] [address] 57887-
8492    Schizoaffective disorder, bipolar type F25.0 ; Impulse 
disorder, unspecified F63.9 ; Attention deficit hyperactivity disorder (ADHD), 
inattentive type, mild F90.0 and Social anxiety disorder F40.10

 

 [facility] [place]  [address] [address]-
8089  12 Oct, 2017  Schizoaffective disorder, bipolar type F25.0 ; Impulse 
disorder, unspecified F63.9 ; Attention deficit hyperactivity disorder (ADHD), 
inattentive type, mild F90.0 and Social anxiety disorder F40.10

 

 [facility] [place]  [address] [place] [address] 80546-
0421  22 Sep, 2017   

 

 [facility] [place]  [id number] [place] [address] 04787-
8804  08 Sep, 2017  Schizoaffective disorder, bipolar type F25.0

 

 [facility] [place]  [address] [place] [address] 18940-
3903  07 Sep, 2017  Other long term (current) drug therapy Z79.899

 

 [facility] [place]  [address] [place] [address] 27611-
5007  01 Sep, 2017  Schizoaffective disorder, bipolar type F25.0 ; Impulse 
disorder, unspecified F63.9 ; Attention deficit hyperactivity disorder (ADHD), 
predominantly inattentive type F90.0 ; Anxiety F41.9 and Other long term (
current) drug therapy Z79.899

 

 [facility] [place]  [id number] [place] [address] 06786-
0914  11 Aug, 2017   

 

 [facility] [place]  [id number] [place] [address] 22439-
4045    Hypercholesterolemia with hyperglyceridemia E78.2

 

 [facility] [place]  [id number] [place] [address] 31861-
8082    Schizoaffective disorder, bipolar type F25.0 and Impulse 
disorder, unspecified F63.9

 

 [facility] [place]  [id number] [place] [address] 15817-
3633    Hypercholesterolemia with hyperglyceridemia E78.2 ; Impulse 
disorder, unspecified F63.9 ; Bipolar disorder, current episode mixed, 
unspecified F31.60 ; Schizoaffective disorder, bipolar type F25.0 and ADD (
attention deficit disorder) F90.0

 

 [facility] [place]  [address] [address]-
3668  17 Mar, 2017   

 

 [facility] [place]  [address] [place] [address] 55445-
0044  13 Mar, 2017   

 

 [facility] [place]  [address] [address]-
1223  08 Mar, 2017  Schizoaffective disorder, bipolar type F25.0

 

 [facility] [place]  [address] [address]-
2238  06 Mar, 2017   

 

 [facility] [place]  [address] [place] [address] 53651-
4986  06 Mar, 2017   

 

 [facility] [place]  [address] [address]-
0857  02 Mar, 2017  Schizoaffective disorder, bipolar type F25.0

 

 [facility] [place]  [id number] [place] [address] 31478-
4647  02 Mar, 2017  Schizoaffective disorder, bipolar type F25.0

 

 [facility] [place]  [id number] [place] [address] 01019-
7391  10 Nov, 2016   

 

 [facility] [place]  [id number] [place] [address] 40477-
4704     

 

 [facility] [place]  [id number] [place] [address] 61381-
6712    Schizoaffective disorder, bipolar type F25.0 and Other long 
term (current) drug therapy Z79.899

 

 [facility] [place]  [id number] [place] [address] 50796-
4811    ADD (attention deficit disorder) F90.0

 

 [facility] [place]  [address] [place] [address] 73597-
6948    ADD (attention deficit disorder) F90.0

 

 [facility] [place]  [address] [place] [address] 62283-
7988    Schizoaffective disorder, bipolar type F25.0 and ADD (
attention deficit disorder) F90.0

 

 [facility] [place]  [address] [address]-
9096     

 

 [facility] [place]  [address] [address]-
9677  27 Oct, 2015   

 

 [facility] [place]  [id number] [place] [address] 28588-
3544  09 Oct, 2015  Encounter for long-term (current) use of other medications 
Z79.899

 

 [facility] [place]  [address] [address]-
1479  07 Oct, 2015  Schizoaffective disorder, bipolar type F25.0 ; Anxiety 
disorder, unspecified F41.9 ; Impulse disorder, unspecified F63.9 and Encounter 
for long-term (current) use of other medications Z79.899

 

 [facility] [place]  [address] [address]-
1573  26 Aug, 2015   

 

 [facility] [place]  [address] [address]-
2089  26 Aug, 2015   

 

 [facility] [place]  [id number] [place] [address] 81362-
3662     

 

 [facility] [place]  [id number] [place] [address] 87797-
6978     

 

 [facility] [place]  [id number] [place] [address] 22936-
7996  12 May, 2015   

 

 [facility] [place]  [id number] [place] [address] 42872-
3990     

 

 [facility] [place]  [id number] [place] [address] 83371-
7971     

 

 [facility] [place]  [address] [place] [address] 72026-
5386  04 Mar, 2015   

 

 [facility] [place]  [address] [address]-
8357  04 Mar, 2015   

 

 [facility] [place]  [address] [address]-
1344     

 

 [facility] [place]  [address] [address]-
6552     

 

 [facility] [place]  [address] [address]-
7572     

 

 [facility] [place]  [address] [address]-
4293     

 

 [facility] [place]  [address] [address]-
3056     

 

 [facility] [place] [facility]  [address] [id number] [place] [address]-
3673     

 

 [facility] [place] [facility]  [address] [id number] [place] [address]-
7345     

 

 [facility] [place] [facility]  [address] [id number] [place] [address]-
9986     

 

 [facility] [place] [facility]  [address] [id number] [place] [address]-
2950     

 

 [facility] [place] [facility]  [address] [id number] [place] [address]-
4609     

 

 [facility] [place] [facility]  [address] [id number] [place] [address]-
1096  22 Dec, 2014   

 

 [facility] [place] [facility]  [address] [id number] [place]
2946  22 Dec, 2014   

 

 [facility] [place] [facility]  [address] [id number] [place] [address]-
0814  02 Dec, 2014   

 

 [facility] [place] [facility]  [address] [id number] [place] [address]-
6144  02 Dec, 2014   

 

 [facility] [place] [facility]  [address] [id number] [place] [address]-
3089  01 Dec, 2014   

 

 [facility] [place] [facility]  [address] [id number] [place]
5381  01 Dec, 2014   

 

 [facility] [place] [facility]  [address] [id number] [place] [address]-
3403     

 

 [facility] [place] [facility]  [address] [id number] [place] [address]-
8434     

 

 [facility] [place] [facility]  [address] [id number] [place] [address]-
1231     

 

 [facility] [place] [facility]  [address] [id number] [place] [address]-
0896     

 

 [facility] [place] [facility]  [address] [id number] [place] [address]-
7259  30 Oct, 2014   

 

 [facility] [place] [facility]  [address] [id number] [place] [address]-
7350  30 Oct, 2014   

 

 [facility] [place] [facility]  [address] [id number] [place] [address]-
3559  27 Oct, 2014   

 

 [facility] [place] [facility]  [address] [id number] [place] [address]-
7469  27 Oct, 2014   

 

 [facility] [place] [facility]  [address] [id number] [place] [address]-
8246  06 Oct, 2014   

 

 [facility] [place] [facility]  [address] [id number] [place] [address]-
5337  06 Oct, 2014   

 

 [facility] [place] [facility]  [address] [id number] [place] [address]-
4093  03 Oct, 2014   

 

 [facility] [place] [facility]  [address] [id number] [place] [address]-
6640  03 Oct, 2014   

 

 [facility] [place] [facility]  [address] [id number] [place] [address]-
7865  05 Sep,    

 

 [facility] [place] [facility]  [address] [id number] [place] [address]-
2992  05 Sep, 2014   

 

 [facility] [place] [facility]  [address] [id number] [place] [address]-
0985  05 Sep, 2014   

 

 [facility] [place] [facility]  [address] [id number] [address]-
3796  05 Sep, 2014   

 

 [facility] [place] [facility]  [address] [id number] [place] [address]-
0553  04 Aug, 2014   

 

 [facility] [place] [facility]  [address] [id number] [place] [address]-
6622  04 Aug, 2014   

 

 [facility] [place] [facility]  [address] [id number] [address]-
8469     

 

 [facility] [place] [facility]  [address] [id number] [place] [address]-
5181     

 

 [facility] [place] [facility]  [address] [id number] [place] [address]-
6257     

 

 [facility] [place] [facility]  [address] [id number] [place] [address]-
7315     

 

 [facility] [place] [facility]  [address] [id number] [place] [address]-
0316     

 

 [facility] [place] [facility]  [address] [id number] [place] [address]-
3119     

 

 [facility] [place] [facility]  [address] [id number] [place] [address]-
6836     

 

 [facility] [place] [facility]  [address] [id number] [place] [address]-
0508     

 

 [facility] [place] [facility]  [address] [id number] [place] [address]-
5736     

 

 [facility] [place] [facility]  [address] [id number] [place] [address]-
6746     

 

 [facility] [place] [facility]  [address] [id number] [place] [address]-
9225  09 May, 2014   

 

 [facility] [place] [facility]  [address] [id number] [place] [address]-
1099  09 May, 2014   

 

 [facility] [place] [facility]  [address] [id number] [place] [address]-
2925     

 

 [facility] [place] [facility]  [address] [id number] [place] [address]-
7648     

 

 [facility] [place] [facility]  [address] [id number] [place] [address]-
8109     

 

 [facility] [place] [facility]  [address] [id number] [place] [address]-
5371     

 

 [facility] [place] [facility]  [address] [id number] [place] [address]-
6943     

 

 [facility] [place] [facility]  [address] [id number] [place] [address]-
8102  06 Mar, 2014   

 

 [facility] [place] [facility]  [address] [id number] [place] [address]-
8266  06 Mar, 2014   

 

 [facility] [place] [facility]  [address] [id number] [place] [address]-
1444     

 

 [facility] [place] [facility]  [address] [id number] [place] [address]-
1104     

 

 [facility] [place] [facility]  [address] [id number] [place] [address]-
4991  10 Chidi, 2014   

 

 [facility] [place] [facility]  [address] [id number] [place] [address]-
4534  10 Chidi, 2014   

 

 [facility] [place] [facility]  [address] [id number] [place] [address]-
9795  23 Dec, 2013   

 

 [facility] [place] [facility]  [address] [id number] [place] [address]-
3629  23 Dec, 2013   

 

 [facility] [place] [facility]  [address] [id number] [place] [address]-
9331  20 Dec, 2013   

 

 [facility] [place] [facility]  [address] [id number] [place] [address]-
4720  20 Dec, 2013   

 

 [facility] [place] [facility]  [address] [id number] [place] [address]-
9531  19 Dec, 2013   

 

 [facility] [place] [facility]  [address] [facility] [id number] [place] [address]-
9556  05 Dec, 2013   

 

 [facility] [place] [facility]  [address] [id number] [place] [address]-
6641  05 Dec, 2013   

 

 [facility] [place] [facility]  [address] [id number] [place] [address]-
9853  03 Dec, 2013   

 

 [facility] [place] [facility]  [address] [id number] [place] [address]-
0870  03 Dec, 2013   

 

 [facility] [place] [facility]  [address] [id number] [address]-
0389  18 Sep, 2013   

 

 [facility] [place] [facility]  [address] [id number] [address]-
9778     

 

 [facility]  [address] [id number] [place] [address]-
5893     

 

 [facility]  [address] [id number] [place] [address]-
3576     

 

 [facility]  [address] [facility] [id number] [place] [address]-
8846  09 May, 2013   

 

 [facility]  [address] [id number] [place] [address]-
9369     

 

 [facility]  [address] [id number] [place] [address]-
5648  18 Mar, 2013   

 

 [facility]  [address] [id number] [place] [address]
2546  07 Mar, 2013   

 

 [facility]  [address] [facility] [id number] [place] [address]-
8648  05 Mar, 2013   

 

 [facility]  [address] [facility] [id number] [place] [address]-
6626     

 

 [facility]  [address] [facility] [id number] [place] [address]-
2204     

 

 [facility]  [address] [facility] [id number] [place] [address]-
8376     

 

 [facility]  [address] [facility] [id number] [place] [address]-
6354     

 

 [facility]  [address] [id number] [place] [address]-
9926  18 Dec, 2012   

 

 [facility]  [address] [id number] [place] [address]-
8599  18 Dec, 2012   

 

 [facility]  [address] [facility] [id number] [place] [address]
2546  06 Dec, 2012   

 

 [facility]  [address] [facility] [id number] [place] [address]-
8036  04 Dec, 2012   

 

 [facility] [place] [facility]  [address] [id number] [place] [address]-
5026  04 Dec, 2012   

 

 [facility] [place] [facility]  [address] [id number] [place] [address]-
0814     

 

 [facility] [place] [facility]  [address] [id number] [place] [address]-
7406     

 

 [facility] [place] [facility]  [address] [id number] [place] [address]-
1374  31 Oct, 2012   

 

 [facility] [place] [facility]  [address] [id number] [place] [address]-
6338  31 Oct, 2012   

 

 [facility] [place] [facility]  [address] [id number] [place] [address]-
7049  26 Oct, 2012   

 

 [facility] [place] [facility]  [address] [id number] [place] [address]-
7009  26 Oct, 2012   

 

 [facility] [place] [facility]  [address] [id number] [place] [address]-
4151  23 Oct, 2012   

 

 [facility] [place] [facility]  [address] [id number] [place]
0500  23 Oct, 2012   

 

 [facility] [place] [facility]  [address] [id number] [place] [address]-
4364  18 Sep, 2012   

 

 [facility] [place] [facility]  [address] [id number] [place] [address]-
3754  23 Aug, 2012   

 

 [facility] [place] [facility]  [address] [id number] [place] [address]-
2718  22 Aug, 2012   

 

 [facility] [place] [facility]  [address] [id number] [place] [address]-
7381     

 

 [facility] [place] [facility]  [address] [id number] [place] [address]-
8596     

 

 [facility] [place] [facility]  [address] [id number] [place] [address]-
2546     

 

 [facility] [place] [facility]  [address] [id number] [place] [address]-
254     

 

 [facility]  [address] [id number] [place] [address]
2546  01 May, 2012   

 

 [facility] [place] [facility]  [address] [id number] [place] [address]-
6956     

 

 [facility] [place] [facility]  [address] [id number] [place] [address]-
2546  10 Apr, 2012   

 

 [facility] [place] [facility]  [address] [id number] [place] [address]-
2546  14 Mar, 2012   

 

 [facility] [place] [facility]  [address] [id number] [place] [address]-
[address]     

 

 [facility] [place] [facility]  [address] [id number] [place] [address]-
8426     

 

 [facility] [place] [facility]  [address] [id number] [place] [address]-
6456  26 Dec, 2011   

 

 [facility] [place] [facility]  [address] [id number] [place] [address]-
3606  21 Dec, 2011   

 

 [facility] [place] [facility]  [address] [id number] [place] [address]-
2757  19 Dec, 2011   

 

 [facility] [place] [facility]  [address] [id number] [place] [address]-
8320  16 Dec, 2011   

 

 [facility] [place] [facility]  [address] [id number] [place] [address]-
4536  16 Dec, 2011   

 

 [facility] [place] [facility]  [address] [id number] [place] [address]-
2545  14 Dec, 2011   

 

 [facility] [place] [facility]  [address] [id number] [place] [address]-
2546  14 Dec, 2011   

 

 [facility] [place] [facility]  [address] [id number] [place] [address]-
2546  17 2011   

 

 [facility] [place] [facility]  [address] [id number] [place] [address]-
2546  17 2011   

 

 [facility] [place] [facility]  [address] [facility] [id number] [place] [address]-
2728  18 Oct, 2011   

 

 [facility] [place]  [address] [facility] [id number] [place], [place] [address]-
5935  10 Dec, 2010   

 

 [facility] [place]  [address] [facility] [id number] [place], [place] [address]-
8564     

 

 [facility] [place]  [address] [facility] [id number] [place], [place] [address]-
2826     

 

 [facility], [place]  [address] [facility] [id number] [place] [address]-
4526  26 Oct, 2010   







IMMUNIZATIONS

No Known Immunizations



SOCIAL HISTORY

Never Assessed



REASON FOR VISIT

acid reflux --tcjefpettESTELITA, Has had acid reflux for several years, but is 
worsening. Unable to eat anything without having a burning sensation and 
vomiting. 



PLAN OF CARE







 Activity  Details









  









 Follow Up  3 Months Reason:gerd

 

 Pending Test  STOOL (H. PYLORI) AG, EIA 



 



VITAL SIGNS







 Height  65 in  2018

 

 Weight  208.0 lbs  2018

 

 Temperature  98.3 degrees Fahrenheit  2018

 

 Heart Rate  84 bpm  2018

 

 Respiratory Rate  20   2018

 

 BMI  34.61 kg/m2  2018

 

 Blood pressure systolic  122 mmHg  2018

 

 Blood pressure diastolic  80 mmHg  2018







MEDICATIONS







 Medication  Instructions  Dosage  Frequency  Start Date  End Date  Duration  
Status

 

 Lithium Carbonate 300 MG  Orally twice a day  2 capsules  12h           Active

 

 Zoloft 50 mg  Orally Once a day  1 tablet  24h          Active

 

 [facility] Omeprazole 20 mg  Orally twice a day 30 mins before food  1 tablet          30 day(s)  Active

 

 Latuda 40 mg  Orally at suppertime  1 tablet with food           90 days  
Active







RESULTS

No Results



PROCEDURES







 Procedure  Date Ordered  Result  Body Site

 

 HPYLORI, STOOL, EIA  2018      







INSTRUCTIONS





MEDICATIONS ADMINISTERED

No Known Medications



MEDICAL (GENERAL) HISTORY







 Type  Description  Date

 

 Medical History  depression   

 

 Medical History  adhd   

 

 Medical History  bipolar   

 

 Medical History  hyperlipidemia   

 

 Medical History  Hx of heart valve dysfunction but says this has resolved   

 

 Medical History  lactose intolerant   

 

 Hospitalization History  [facility] x4, last incident at age 9

## 2018-10-30 NOTE — XMS REPORT
[facility]

 Created on: 2017



Oliver Owens

External Reference #: [id number]

: 1995

Sex: Male



Demographics







 Address  [address] [place]  [address]

 

 Preferred Language  Unknown

 

 Marital Status  Unknown

 

 Islam Affiliation  Unknown

 

 Race  Unknown

 

 Ethnic Group  Unknown





Author







 Author  PADMA Calero

 

 [facility]

 

 Address  [address] [place]  [address]



 

 Phone  [phone]







Care Team Providers







 Care Team Member Name  Role  Phone

 

 PADMA Calero  Unavailable  [phone]







PROBLEMS







 Type  Condition  ICD9-CM Code  DGF86-EY Code  Onset Dates  Condition Status  
SNOMED Code

 

 Problem  Schizoaffective disorder, bipolar type     F25.0     Active  31702899

 

 Problem  ADD (attention deficit disorder)     F90.0     Active  963021045

 

 Problem  Social anxiety disorder     F40.10     Active  22657378

 

 Problem  Attention deficit hyperactivity disorder (ADHD), predominantly 
inattentive type     F90.0     Active  71120959

 

 Problem  Impulse disorder, unspecified     F63.9     Active  83857216

 

 Problem  Hypercholesterolemia with hyperglyceridemia     E78.2     Active  
997803039

 

 Problem  Anxiety     F41.9     Active  35812311

 

 Problem  Bipolar disorder, current episode mixed, unspecified     F31.60     
Active  17266233







ALLERGIES

No Information



SOCIAL HISTORY

Never Assessed



PLAN OF CARE





VITAL SIGNS





MEDICATIONS

Unknown Medications



RESULTS

No Results



PROCEDURES

No Known procedures



IMMUNIZATIONS

No Known Immunizations



MEDICAL (GENERAL) HISTORY







 Type  Description  Date

 

 Medical History  depression   

 

 Medical History  adhd   

 

 Medical History  bipolar   

 

 Medical History  hyperlipidemia   

 

 Medical History  Hx of heart valve dysfunction but says this has resolved   

 

 Medical History  lactose intolerant   

 

 Hospitalization History  [facility] x4, last incident at age 9

## 2018-10-30 NOTE — XMS REPORT
[facility]

 Created on: 10/27/2015



Oliver Owens

External Reference #: [id number]

: 1995

Sex: Male



Demographics







 Address  [address] [place]  [address]

 

 Home Phone  [phone]

 

 Preferred Language  Unknown

 

 Marital Status  Unknown

 

 Zoroastrianism Affiliation  Unknown

 

 Race  White

 

 Ethnic Group  Not  or 





Author







 PAPI Gong

 

 Organization  [employer]

 

 Address  Unknown

 

 Phone  Unavailable







Care Team Providers







 Care Team Member Name  Role  Phone

 

 PAPI PRICE  CP  Unavailable



                                                                



Allergies

          No Known Allergies                                                   
                                     



Problems

          





 Problem Type  Condition  Code  Onset Dates  Condition Status

 

 Problem  Anxiety state, unspecified  300.00     Active

 

 Problem  Unspecified psychosis  298.9     Active

 

 Problem  Schizoaffective disorder, unspecified  295.70     Active

 

 Problem  Bipolar I disorder, most recent episode (or current) mixed, 
unspecified  296.60     Active

 

 Problem  Impulse control disorder, unspecified  312.30     Active

 

 Problem  Other dysfunctions of sleep stages or arousal from sleep  307.47     
Active



                                                                               
                                                           



Medications

          





 Medication  Code System  Code  Instructions  Start Date  End Date  Status  
Dosage

 

 Seroquel XR  NDC  [address]-59  200 MG Orally Once a day  Oct 07, 2015        
1 tablet in the evening



                                                                              



Results

          No Known Results                                                     
               



Summary Purpose

          [employer] Submission

## 2018-10-30 NOTE — XMS REPORT
[facility]

 Created on: 2016



Oliver Owens

External Reference #: [id number]

: 1995

Sex: Male



Demographics







 Address  [address] [place]  [address]

 

 Home Phone  [phone]

 

 Preferred Language  Unknown

 

 Marital Status  Unknown

 

 Mormonism Affiliation  Unknown

 

 Race  White

 

 Ethnic Group  Not  or 





Author







 KATHERYN Carty

 

 [facility]  [employer]

 

 Address  Unknown

 

 Phone  Unavailable







Care Team Providers







 Care Team Member Name  Role  Phone

 

 KATHERYN MORGAN  CP  Unavailable



                                                                



Allergies

          No Known Allergies                                                   
                                     



Problems

          





 Problem Type  Condition  Code  Onset Dates  Condition Status

 

 Problem  ADD (attention deficit disorder)  F90.0     Active

 

 Problem  Impulse control disorder, unspecified  312.30     Active

 

 Problem  Schizoaffective disorder, bipolar type  F25.0     Active

 

 Problem  Bipolar I disorder, most recent episode (or current) mixed, 
unspecified  296.60     Active



                                                                               
                                       



Medications

          





 Medication  Code System  Code  Instructions  Start Date  End Date  Status  
Dosage

 

 Latuda  NDC  [address]-20  20 mg Orally Once a day  2016        1 
tablet with food



                                                                              



Results

          No Known Results                                                     
               



Summary Purpose

          [employer] Submission

## 2018-10-30 NOTE — XMS REPORT
[facility]

 Created on: 2015



Oliver Owens

External Reference #: [id number]

: 1995

Sex: Male



Demographics







 Address  [address] [place]  [address]

 

 Home Phone  [phone]

 

 Preferred Language  Unknown

 

 Marital Status  Unknown

 

 Jewish Affiliation  Unknown

 

 Race  White

 

 Ethnic Group  Not  or 





Author







 PAPI Gong

 

 Organization  [employer]

 

 Address  Unknown

 

 Phone  Unavailable







Care Team Providers







 Care Team Member Name  Role  Phone

 

 PAPI PRICE  CP  Unavailable



                                                                



Allergies

          No Known Allergies                                                   
                                     



Problems

          





 Problem Type  Condition  ICD-9 Code  Onset Dates  Condition Status

 

 Problem  Anxiety state, unspecified  300.00     Active

 

 Problem  Unspecified psychosis  298.9     Active

 

 Problem  Schizoaffective disorder, unspecified  295.70     Active

 

 Problem  Bipolar I disorder, most recent episode (or current) mixed, 
unspecified  296.60     Active

 

 Problem  Impulse control disorder, unspecified  312.30     Active

 

 Problem  Other dysfunctions of sleep stages or arousal from sleep  307.47     
Active



                                                                               
                                                           



Medications

          





 Medication  Code System  Code  Instructions  Start Date  End Date  Status  
Dosage

 

 Lithium Carbonate  NDC  [address]-77  300 MG Orally Once a day with dinner   
        3 capsules



                                                                              



Results

          No Known Results                                                     
               



Summary Purpose

          [employer] Submission

## 2018-10-30 NOTE — XMS REPORT
[facility]

 Created on: 2017



Oliver Owens

External Reference #: [id number]

: 1995

Sex: Male



Demographics







 Address  [address] [place]  [address]

 

 Preferred Language  Unknown

 

 Marital Status  Unknown

 

 Restorationism Affiliation  Unknown

 

 Race  Unknown

 

 Ethnic Group  Unknown





Author







 Author  KATHERYN MORGAN

 

 [facility]

 

 Address  [address] [place]  [address]



 

 Phone  [phone]







Care Team Providers







 Care Team Member Name  Role  Phone

 

 KATHERYN MORGAN  Unavailable  [phone]







PROBLEMS







 Type  Condition  ICD9-CM Code  GUZ43-ZS Code  Onset Dates  Condition Status  
SNOMED Code

 

 Problem  Schizoaffective disorder, bipolar type     F25.0     Active  94013931

 

 Problem  Anxiety     F41.9     Active  25790065

 

 Problem  Attention deficit hyperactivity disorder (ADHD), predominantly 
inattentive type     F90.0     Active  73102805

 

 Problem  Impulse disorder, unspecified     F63.9     Active  15795014

 

 Problem  ADD (attention deficit disorder)     F90.0     Active  479649737

 

 Problem  Hypercholesterolemia with hyperglyceridemia     E78.2     Active  
220905388

 

 Problem  Bipolar disorder, current episode mixed, unspecified     F31.60     
Active  39853191







ALLERGIES

No Information



SOCIAL HISTORY

Never Assessed



PLAN OF CARE





VITAL SIGNS





MEDICATIONS







 Medication  Instructions  Dosage  Frequency  Start Date  End Date  Duration  
Status

 

 Latuda 20 mg  Orally BID  1 tablet with food  12h       90 days  
Active







RESULTS

No Results



PROCEDURES

No Known procedures



IMMUNIZATIONS

No Known Immunizations



MEDICAL (GENERAL) HISTORY







 Type  Description  Date

 

 Medical History  depression   

 

 Medical History  adhd   

 

 Medical History  bipolar   

 

 Medical History  hyperlipidemia   

 

 Medical History  Hx of heart valve dysfunction but says this has resolved   

 

 Hospitalization History  [facility] x4, last incident at age 9

## 2018-10-30 NOTE — XMS REPORT
[facility]

 Created on: 2018



Oliver Owens

External Reference #: [id number]

: 1995

Sex: Male



Demographics







 Address  [address] [place]  [address]

 

 Preferred Language  Unknown

 

 Marital Status  Unknown

 

 Sikhism Affiliation  Unknown

 

 Race  Unknown

 

 Ethnic Group  Unknown





Author







 Author  RHONA  PATTY

 

 [facility]

 

 Address  [address] [place]  [address]



 

 Phone  [phone]







Care Team Providers







 Care Team Member Name  Role  Phone

 

 RHONAPATTY  Unavailable  [phone]







PROBLEMS







 Type  Condition  ICD9-CM Code  NIX50-LW Code  Onset Dates  Condition Status  
SNOMED Code

 

 Problem  Schizoaffective disorder, bipolar type     F25.0     Active  80288739

 

 Problem  ADD (attention deficit disorder)     F90.0     Active  139085294

 

 Problem  Social anxiety disorder     F40.10     Active  65372685

 

 Problem  Attention deficit hyperactivity disorder (ADHD), predominantly 
inattentive type     F90.0     Active  25333724

 

 Problem  Impulse disorder, unspecified     F63.9     Active  10511887

 

 Problem  Hypercholesterolemia with hyperglyceridemia     E78.2     Active  
868154740

 

 Problem  Anxiety     F41.9     Active  96628442

 

 Problem  Bipolar disorder, current episode mixed, unspecified     F31.60     
Active  11900900







ALLERGIES

No Information



ENCOUNTERS







 Encounter  Location  Date  Diagnosis

 

 [facility]  [address] [place] [address]-
6554     

 

 [facility]  [address] [place] [address]36-
8951     

 

 [facility]  [address] [place] [address] 8[address]41-
2064    Schizoaffective disorder, bipolar type F25.0 ; Impulse 
disorder, unspecified F63.9 ; Attention deficit hyperactivity disorder (ADHD), 
inattentive type, mild F90.0 and Social anxiety disorder F40.10

 

 [facility]  [address] [place] [address]33-
0557    Schizoaffective disorder, bipolar type F25.0 ; Impulse 
disorder, unspecified F63.9 ; Attention deficit hyperactivity disorder (ADHD), 
inattentive type, mild F90.0 and Social anxiety disorder F40.10

 

 [facility]  [address], [place] [address]-
7988  13 Dec, 2017  Schizoaffective disorder, bipolar type F25.0 ; Impulse 
disorder, unspecified F63.9 ; Attention deficit hyperactivity disorder (ADHD), 
inattentive type, mild F90.0 and Social anxiety disorder F40.10

 

 [facility]  [address], [place] [address]
2933  11 Dec, 2017   

 

 [facility]  [address] [place] [address] 6[address]-
7378    Schizoaffective disorder, bipolar type F25.0

 

 [place] [address] [place] [address] 75684-
7404    Schizoaffective disorder, bipolar type F25.0 ; Impulse 
disorder, unspecified F63.9 ; Attention deficit hyperactivity disorder (ADHD), 
inattentive type, mild F90.0 and Social anxiety disorder F40.10

 

 [place] [address], [place] [address]-
5509  12 Oct, 2017  Schizoaffective disorder, bipolar type F25.0 ; Impulse 
disorder, unspecified F63.9 ; Attention deficit hyperactivity disorder (ADHD), 
inattentive type, mild F90.0 and Social anxiety disorder F40.10

 

 [place] [address] [address]-
8123  22 Sep, 2017   

 

 [place] [address] [address]-
7432  08 Sep, 2017  Schizoaffective disorder, bipolar type F25.0

 

 [facility]  [address] [address]-
2932  07 Sep, 2017  Other long term (current) drug therapy Z79.899

 

 [facility]  [address], [place] [address]-
3089  01 Sep, 2017  Schizoaffective disorder, bipolar type F25.0 ; Impulse 
disorder, unspecified F63.9 ; Attention deficit hyperactivity disorder (ADHD), 
predominantly inattentive type F90.0 ; Anxiety F41.9 and Other long term (
current) drug therapy Z79.899

 

 [facility]  [address] [place] [address] 77845-
6199  11 Aug, 2017   

 

 [facility]  [address] [place] [address] 25657-
0360    Hypercholesterolemia with hyperglyceridemia E78.2

 

 [facility]  [address] [place] [address] 18370-
7253    Schizoaffective disorder, bipolar type F25.0 and Impulse 
disorder, unspecified F63.9

 

 [place] [address] [place] [address] 03483-
1909    Hypercholesterolemia with hyperglyceridemia E78.2 ; Impulse 
disorder, unspecified F63.9 ; Bipolar disorder, current episode mixed, 
unspecified F31.60 ; Schizoaffective disorder, bipolar type F25.0 and ADD (
attention deficit disorder) F90.0

 

 [facility]  [address] [place] [address] 68957-
8626  17 Mar, 2017   

 

 [facility]  [address] [place] [address] 32279-
2308  13 Mar, 2017   

 

 [facility]  [address] [place] [address] 02903-
5876  08 Mar, 2017  Schizoaffective disorder, bipolar type F25.0

 

 [facility]  [address] [place] [address] 66655-
8821  06 Mar, 2017   

 

 [facility]  [address] [place] [address] 15676-
9075  06 Mar, 2017   

 

 [facility]  [address] [place] [address] 71491-
2819  02 Mar, 2017  Schizoaffective disorder, bipolar type F25.0

 

 [facility]  [address] [place] [address] 26016-
1016  02 Mar, 2017  Schizoaffective disorder, bipolar type F25.0

 

 [facility]  [address] [address]-
0887  10 Nov, 2016   

 

 [facility]  [address] [place] [address] 71490-
4943     

 

 [facility]  [address] [place] [address] 31289-
5968    Schizoaffective disorder, bipolar type F25.0 and Other long 
term (current) drug therapy Z79.899

 

 [facility]  [address] [place] [address] 51457-
8395    ADD (attention deficit disorder) F90.0

 

 [facility]  [address] [place] [address] 58159-
2756    ADD (attention deficit disorder) F90.0

 

 [facility]  [address] [place] [address] 61640-
4077    Schizoaffective disorder, bipolar type F25.0 and ADD (
attention deficit disorder) F90.0

 

 [facility]  [address] [place] [address] 19636-
5043     

 

 [facility]  [address] [place] [address] 63609-
1086  27 Oct, 2015   

 

 [facility]  [address] [place] [address] 95368-
7973  09 Oct, 2015  Encounter for long-term (current) use of other medications 
Z79.899

 

 [facility]  [address] [place] [address] 93470-
1230  07 Oct, 2015  Schizoaffective disorder, bipolar type F25.0 ; Anxiety 
disorder, unspecified F41.9 ; Impulse disorder, unspecified F63.9 and Encounter 
for long-term (current) use of other medications Z79.899

 

 [facility]  [address], [place] [address]-
6845  26 Aug, 2015   

 

 [facility] [place] [facility]  [address] [id number] [place] [address]-
9635  26 Aug, 2015   

 

 [facility] [place] [facility]  [address] [id number] [place] [address]-
7586     

 

 [facility] [place] [facility]  [address] [id number] [place] [address]-
2692     

 

 [facility] [place] [facility]  [address] [id number] [place] [address]-
7421  12 May, 2015   

 

 [facility] [place] [facility]  [address] [id number] [place] [address]-
0462     

 

 [facility] [place] [facility]  [address] [id number] [place] [address]-
0813     

 

 [facility] [place] [facility]  [address] [facility] [id number] [place] [address]-
4715  04 Mar, 2015   

 

 [facility] [place] [facility]  [address] [facility] [id number] [place] [address]-
1041  04 Mar, 2015   

 

 [facility] [place] [facility]  [address] [id number] [place] [address]-
8491     

 

 [facility] [place] [facility]  [address] [facility] [id number] [place] [address]-
9339     

 

 [facility] [place] [facility]  [address] [facility] [id number] [place] [address]-
3244     

 

 [facility] [place] [facility]  [address] [facility] [id number] [place] [address]-
0464     

 

 [facility] [place] [facility]  [address] [facility] [id number] [place]
0851     

 

 [facility] [place] [facility]  [address] [id number] [place] [address]-
6231     

 

 [facility] [place] [facility]  [address] [facility] [id number] [place] [address]-
0590     

 

 [facility] [place] [facility]  [address] [facility] [id number] [place] [address]-
5930     

 

 [facility] [place] [facility]  [address] [id number] [place] [address]-
7171     

 

 [facility] [place] [facility]  [address] [id number] [place] [address]-
7449     

 

 [facility] [place] [facility]  [address] [id number] [place] [address]-
6585  22 Dec, 2014   

 

 [facility] [place] [facility]  [address] [id number] [place] [address]-
2634  22 Dec, 2014   

 

 [facility] [place] [facility]  [address] [id number] [place] [address]-
0964  02 Dec, 2014   

 

 [facility] [place] [facility]  [address] [id number] [place] [address]-
6962  02 Dec, 2014   

 

 [facility] [place] [facility]  [address] [id number] [place] [address]-
6865  01 Dec, 2014   

 

 [facility] [place] [facility]  [address] [id number] [place] [address]-
2393  01 Dec, 2014   

 

 [facility] [place] [facility]  [address] [id number] [place] [address]-
2266     

 

 [facility] [place] [facility]  [address] [id number] [place] [address]-
8810     

 

 [facility] [place] [facility]  [address] [id number] [address]-
1789     

 

 [facility] [place] [facility]  [address] [id number] [address]-
4311     

 

 [facility] [place] [facility]  [address] [id number] [place] [address]-
9023  30 Oct, 2014   

 

 [facility] [place] [facility]  [address] [id number] [place] [address]-
4225  30 Oct, 2014   

 

 [facility] [place] [facility]  [address] [id number] [place] [address]-
3335  27 Oct, 2014   

 

 [facility] [place] [facility]  [address] [id number] [place] [address]-
9460  27 Oct, 2014   

 

 [facility] [place] [facility]  [address] [id number] [place] [address]-
0117  06 Oct, 2014   

 

 [facility] [place] [facility]  [address] [id number] [place] [address]-
9779  06 Oct, 2014   

 

 [facility] [place] [facility]  [address] [id number] [place] [address]-
7290  03 Oct, 2014   

 

 [facility] [place] [facility]  [address] [id number] [place] [address]-
1487  03 Oct, 2014   

 

 [facility] [place] [facility]  [address] [id number] [place]-
2827  05 Sep,    

 

 [facility] [place] [facility]  [address] [id number] [place] [address]-
4343  05 Sep,    

 

 [facility] [place] [facility]  [address] [id number] [place] [address]-
3337  05 Sep,    

 

 [facility] [place] [facility]  [address] [id number] [place] [address]-
7076  05 Sep, 2014   

 

 [facility] [place] [facility]  [address] [id number] [place] [address]-
1946  04 Aug, 2014   

 

 [facility] [place] [facility]  [address] [id number] [place] [address]-
4928  04 Aug, 2014   

 

 [facility] [place] [facility]  [address] [id number] [place] [address]-
2170     

 

 [facility] [place] [facility]  [address] [id number] [place] [address]-
6971     

 

 [facility] [place] [facility]  [address] [id number] [place] [address]-
1398     

 

 [facility] [place] [facility]  [address] [id number] [place] [address]-
3862     

 

 [facility] [place] [facility]  [address] [id number] [place] [address]-
9879     

 

 [facility] [place] [facility]  [address] [id number] [place] [address]-
3138     

 

 [facility] [place] [facility]  [address] [id number] [place] [address]-
3657     

 

 [facility] [place] [facility]  [address] [id number] [place] [address]-
0447     

 

 [facility] [place] [facility]  [address] [id number] [place] [address]-
1037     

 

 [facility] [place] [facility]  [address] [id number] [place] [address]-
4337     

 

 [facility] [place] [facility]  [address] [id number] [place] [address]-
7110  09 May, 2014   

 

 [facility] [place] [facility]  [address] [id number] [place] [address]-
5064  09 May, 2014   

 

 [facility] [place] [facility]  [address] [id number] [place] [address]-
5758     

 

 [facility] [place] [facility]  [address] [id number] [place] [address]-
2522     

 

 [facility] [place] [facility]  [address] [id number] [place] [address]-
4260     

 

 [facility] [place] [facility]  [address] [id number] [place] [address]-
4064     

 

 [facility] [place] [facility]  [address] [id number] [place] [address]-
3771     

 

 [facility] [place] [facility]  [address] [id number] [place] [address]-
5522  06 Mar, 2014   

 

 [facility] [place] [facility]  [address] [id number] [place] [address]-
6901  06 Mar, 2014   

 

 [facility] [place] [facility]  [address] [id number] [place] [address]-
6260     

 

 [facility] [place] [facility]  [address] [id number] [place] [address]-
0718  05 2014   

 

 [facility]  [address] [id number] [place] [address]-
3080  10 2014   

 

 [facility] [place] [facility]  [address] [id number] [place] [address]-
4299  10 2014   

 

 [facility]  [address] [facility] [id number] [place] [address]-
2351  23 Dec, 2013   

 

 [facility] [place] [facility]  [address] [id number] [place] [address]-
0118  23 Dec, 2013   

 

 [facility]  [address] [id number] [place] [address]-
4804  20 Dec, 2013   

 

 [facility] [place] [facility]  [address] [id number] [place] [address]-
4826  20 Dec, 2013   

 

 [facility]  [address] [facility] [id number] [place] [address]-
1787  19 Dec, 2013   

 

 [facility] [place] [facility]  [address] [id number] [place] [address]-
2167  05 Dec, 2013   

 

 [facility] [place] [facility]  [address] [id number] [place] [address]-
2163  05 Dec, 2013   

 

 [facility] [place] [facility]  [address] [facility] [id number] [place] [address]-
7145  03 Dec, 2013   

 

 [facility] [place] [facility]  [address] [id number] [place] [address]-
5154  03 Dec, 2013   

 

 [facility] [place] [facility]  [address] [id number] [address]-
8953  18 Sep, 2013   

 

 [facility] [place] [facility]  [address] [id number] [place] [address]-
9186     

 

 [facility] [place] [facility]  [address] [id number] [place] [address]-
8346  18 2013   

 

 [facility] [place] [facility]  [address] [id number][place] [address]-
7624  14 2013   

 

 [facility] [place] [facility]  [address] [id number] [place] [address]-
2546  09 May, 2013   

 

 [facility]  [address] [id number] [place] [address]-
3496     

 

 [facility] [place] [facility]  [address] [id number] [place] [address]-
2546  18 Mar, 2013   

 

 [facility] [place] [facility]  [address] [id number] [place] [address]-
2546  07 Mar, 2013   

 

 [facility]  [address] [id number] [place] [address]-
2542  05 Mar, 2013   

 

 [facility] [place] [facility]  [address] [id number] [place] [address]-
2876     

 

 [facility]  [address] [id number] [place] [address]-
5015     

 

 [facility]  [address] [id number] [place] [address]-
2768     

 

 [facility]  [address] [id number] [place] [address]-
3837     

 

 [facility]  [address] [id number] [place] [address]-
7133  18 Dec, 2012   

 

 [facility]  [address] [id number] [place] [address]-
7806  18 Dec, 2012   

 

 [facility] [place] [facility]  [address] [id number] [place] [address]-
2546  06 Dec, 2012   

 

 [facility] [place] [facility]  [address] [id number] [place] [address]-
2546  04 Dec, 2012   

 

 [facility] [place] [facility]  [address] [id number] [place] [address]-
2546  04 Dec, 2012   

 

 [facility] [place] [facility]  [address] [id number] [place] [address]-
2546     

 

 [facility] [place] [facility]  [address] [id number] [place] [address]-
6594     

 

 [facility] [place] [facility]  [address] [id number] [place] [address]-
8868  31 Oct, 2012   

 

 [facility] [place] [facility]  [address] [id number] [place] [address]-
1701  31 Oct, 2012   

 

 [facility] [place] [facility]  [address] [id number] [place] [address]-
2019  26 Oct, 2012   

 

 [facility] [place] [facility]  [address] [id number] [place]
3760  26 Oct, 2012   

 

 [facility] [place] [facility]  [address] [id number] [place] [address]-
4674  23 Oct, 2012   

 

 [facility] [place] [facility]  [address] [id number] [place] [address]-
0890  23 Oct, 2012   

 

 [facility] [place] [facility]  [address] [id number] [place] [address]-
3116  18 Sep, 2012   

 

 [facility] [place] [facility]  [address] [id number] [place] [address]-
4513  23 Aug, 2012   

 

 [facility] [place] [facility]  [address] [id number] [place] [address]-
1072  22 Aug, 2012   

 

 [facility] [place] [facility]  [address] [id number] [place] [address]-
4779     

 

 [facility] [place] [facility]  [address] [id number] [place] [address]-
0219     

 

 [facility] [place] [facility]  [address] [id number] [place] [address]-
5905     

 

 [facility] [place] [facility]  [address] [id number] [place] [address]-
4796     

 

 [facility] [place] [facility]  [address] [id number] [place] [address]-
4620  01 May, 2012   

 

 [facility] [place] [facility]  [address] [id number] [place] [address]-
3808     

 

 [facility] [place] [facility]  [address] [id number] [place] [address]-
2299  10 2012   

 

 [facility]  [address] [id number] [place] [address]-
2843  14 Mar, 2012   

 

 [facility]  [address] [id number] [place] [address]-
3896  31 2012   

 

 [facility]  [address] [id number] [place] [address]-
3064  17 2012   

 

 [facility]  [address] [id number] [place] [address]-
0966  26 Dec, 2011   

 

 [facility]  [address] [id number] [place] [address]-
2539  21 Dec, 2011   

 

 [facility]  [address] [id number] [place] [address]-
4454  19 Dec, 2011   

 

 [facility]  [address] [id number] [place] [address]-
2912  16 Dec, 2011   

 

 [facility]  [address] [id number] [place] [address]-
5236  16 Dec, 2011   

 

 [facility]  [address] [id number] [place] [address]-
3426  14 Dec, 2011   

 

 [facility]  [address] [id number] [place] [address]-
70[address]  14 Dec, 2011   

 

 [facility]  [address] [id number] [place] [address]-
[address]  17 2011   

 

 [facility]  [address] [id number] [place] [address]-
[address]  17 2011   

 

 [facility] [place] [facility]  [address] [id number] [place] [address]-
5368  18 Oct, 2011   

 

 [facility] [place] [facility]  [address] [id number] [place] [address]-
[address]46  10 Dec, 2010   

 

 [facility]  [address] [id number] [place] [address]-
[address]     

 

 [facility]  [address] [facility] [id number] [place] [address]-
2246     

 

 [facility]  [address] [facility] [address] [id number] [place] [address]-
6156  26 Oct, 2010   







IMMUNIZATIONS

No Known Immunizations



SOCIAL HISTORY

Never Assessed



REASON FOR VISIT





PLAN OF CARE





VITAL SIGNS





MEDICATIONS

Unknown Medications



RESULTS

No Results



PROCEDURES

No Known procedures



INSTRUCTIONS





MEDICATIONS ADMINISTERED

No Known Medications



MEDICAL (GENERAL) HISTORY







 Type  Description  Date

 

 Medical History  depression   

 

 Medical History  adhd   

 

 Medical History  bipolar   

 

 Medical History  hyperlipidemia   

 

 Medical History  Hx of heart valve dysfunction but says this has resolved   

 

 Medical History  lactose intolerant   

 

 Hospitalization History  [facility] x4, last incident at age 9

## 2018-10-30 NOTE — XMS REPORT
[facility]

 Created on: 10/09/2015



Oliver Owens

External Reference #: [id number]

: 1995

Sex: Male



Demographics







 Address  [address] [place]  [address]

 

 Home Phone  [phone]

 

 Preferred Language  Unknown

 

 Marital Status  Unknown

 

 Sabianism Affiliation  Unknown

 

 Race  White

 

 Ethnic Group  Not  or 





Author







 PAPI Gong

 

 [facility]  [employer]

 

 Address  Unknown

 

 Phone  Unavailable







Care Team Providers







 Care Team Member Name  Role  Phone

 

 PAPI PRICE  CP  Unavailable



                                                                



Allergies

          No Known Allergies                                                   
                                     



Problems

          





 Problem Type  Condition  Code  Onset Dates  Condition Status

 

 Problem  Anxiety state, unspecified  300.00     Active

 

 Problem  Unspecified psychosis  298.9     Active

 

 Problem  Schizoaffective disorder, unspecified  295.70     Active

 

 Problem  Bipolar I disorder, most recent episode (or current) mixed, 
unspecified  296.60     Active

 

 Assessment  Encounter for long-term (current) use of other medications  
Z79.899     Active

 

 Problem  Impulse control disorder, unspecified  312.30     Active

 

 Problem  Other dysfunctions of sleep stages or arousal from sleep  307.47     
Active



                                                                               
                                                                     



Medications

          No Known Medications                                                 
                                       



Procedures

          





 Procedure  Coding System  Code  Date

 

 VENIPUNCT, ROUTINE*  CPT-4  03657  Oct 09, 2015

 

 ASSAY OF LITHIUM  CPT-4  91241  Oct 09, 2015



                                                                               
         



Results

          No Known Results                                                     
               



Summary Purpose

          eClinicalWorks Submission

## 2018-10-30 NOTE — XMS REPORT
[facility]

 Created on: 2016



Luis A Owensdanisha

External Reference #: [id number]

: 1995

Sex: Male



Demographics







 Address  [address] [place]  [address]

 

 Home Phone  [phone]

 

 Preferred Language  Unknown

 

 Marital Status  Unknown

 

 Baptism Affiliation  Unknown

 

 Race  White

 

 Ethnic Group  Not  or 





Author







 KATHERYN Carty

 

 [facility]  [employer]

 

 Address  Unknown

 

 Phone  Unavailable







Care Team Providers







 Care Team Member Name  Role  Phone

 

 KATHERYN MORGAN  CP  Unavailable



                                                                



Allergies, Adverse Reactions, Alerts

          





 Substance  Reaction  Event Type

 

 Cipro  hives  Drug Allergy



                                                                               
         



Problems

          





 Problem Type  Condition  Code  Onset Dates  Condition Status

 

 Problem  ADD (attention deficit disorder)  F90.0     Active

 

 Problem  Impulse control disorder, unspecified  312.30     Active

 

 Problem  Schizoaffective disorder, bipolar type  F25.0     Active

 

 Assessment  ADD (attention deficit disorder)  F90.0     Active

 

 Problem  Bipolar I disorder, most recent episode (or current) mixed, 
unspecified  296.60     Active

 

 Assessment  Schizoaffective disorder, bipolar type  F25.0     Active



                                                                               
                                                           



Medications

          





 Medication  Code System  Code  Instructions  Start Date  End Date  Status  
Dosage

 

 Lithium Carbonate  NDC  [address]-99  300 MG Orally Once a day with dinner   
        3 capsules

 

 Abilify  NDC  [address]-30  10 MG Orally Once a day  2015        1 
Tablet

 

 Trazodone HCl  NDC  [address]-61  50 mg             TAKE ONE-HALF TO ONE 
TABLET BY MOUTH AT BEDTIME AS NEEDED

 

 Seroquel XR  NDC  [address]-43  200 MG Orally Once a day           1 tablet 
in the evening



                                                                               
                                       



Procedures

          





 Procedure  Coding System  Code  Date

 

 Office Visit, Est Pt., Level 3  CPT-4  99613  2016



                                                                               
                   



Vital Signs

          





 Date/Time:  2016

 

 Temperature  98.8 F

 

 Weight  204.8 lbs

 

 Height  64 in

 

 BMI  35.15 Index

 

 Blood Pressure Diastolic  76 mmHg

 

 Blood Pressure Systolic  126 mmHg

 

 Cardiac Monitoring Heart Rate  72 bpm



                                                                              



Results

          No Known Results                                                     
               



Summary Purpose

          eClinicalWorks Submission

## 2018-10-30 NOTE — XMS REPORT
[facility]

 Created on: 10/04/2017



Oliver Owens

External Reference #: [id number]

: 1995

Sex: Male



Demographics







 Address  [address] [place]  [address]

 

 Preferred Language  Unknown

 

 Marital Status  Unknown

 

 Voodoo Affiliation  Unknown

 

 Race  Unknown

 

 Ethnic Group  Unknown





Author







 Author  KATHERYN MORGAN

 

 [facility]

 

 Address  [address] [place]  [address]



 

 Phone  [phone]







Care Team Providers







 Care Team Member Name  Role  Phone

 

 KATHERYN MORGAN  Unavailable  [phone]







PROBLEMS







 Type  Condition  ICD9-CM Code  WEJ06-CD Code  Onset Dates  Condition Status  
SNOMED Code

 

 Problem  Schizoaffective disorder, bipolar type     F25.0     Active  82174184

 

 Problem  Anxiety     F41.9     Active  25955012

 

 Problem  Attention deficit hyperactivity disorder (ADHD), predominantly 
inattentive type     F90.0     Active  13167321

 

 Problem  Impulse disorder, unspecified     F63.9     Active  35810864

 

 Problem  ADD (attention deficit disorder)     F90.0     Active  887761246

 

 Problem  Hypercholesterolemia with hyperglyceridemia     E78.2     Active  
556014998

 

 Problem  Bipolar disorder, current episode mixed, unspecified     F31.60     
Active  00960547







ALLERGIES

No Information



SOCIAL HISTORY

Never Assessed



PLAN OF CARE





VITAL SIGNS





MEDICATIONS







 Medication  Instructions  Dosage  Frequency  Start Date  End Date  Duration  
Status

 

 Latuda 40 mg  Orally at suppertime  1 tablet with food     13 Mar, 2017     90 
days  Active







RESULTS

No Results



PROCEDURES

No Known procedures



IMMUNIZATIONS

No Known Immunizations



MEDICAL (GENERAL) HISTORY







 Type  Description  Date

 

 Medical History  depression   

 

 Medical History  adhd   

 

 Medical History  bipolar   

 

 Medical History  hyperlipidemia   

 

 Medical History  Hx of heart valve dysfunction but says this has resolved   

 

 Hospitalization History  [facility] [place] x4, last incident at age 9

## 2018-10-30 NOTE — XMS REPORT
[facility]

 Created on: 2015



Oliver Owens

External Reference #: [id number]

: 1995

Sex: Male



Demographics







 Address  [address] [place]  [address]

 

 Home Phone  [phone]

 

 Preferred Language  Unknown

 

 Marital Status  Unknown

 

 Christianity Affiliation  Unknown

 

 Race  White

 

 Ethnic Group  Not  or 





Author







 PAPI Gong

 

 Organization  [employer]

 

 Address  Unknown

 

 Phone  Unavailable







Care Team Providers







 Care Team Member Name  Role  Phone

 

 PAPI PRICE  CP  Unavailable



                                                                



Allergies

          No Known Allergies                                                   
                                     



Problems

          





 Problem Type  Condition  ICD-9 Code  Onset Dates  Condition Status

 

 Problem  Anxiety state, unspecified  300.00     Active

 

 Problem  Unspecified psychosis  298.9     Active

 

 Problem  Schizoaffective disorder, unspecified  295.70     Active

 

 Problem  Bipolar I disorder, most recent episode (or current) mixed, 
unspecified  296.60     Active

 

 Problem  Impulse control disorder, unspecified  312.30     Active

 

 Problem  Other dysfunctions of sleep stages or arousal from sleep  307.47     
Active



                                                                               
                                                           



Medications

          





 Medication  Code System  Code  Instructions  Start Date  End Date  Status  
Dosage

 

 Lithium Carbonate  NDC  [address]-43  300 MG Orally Once a day with dinner   
        3 capsules



                                                                              



Results

          No Known Results                                                     
               



Summary Purpose

          eClinicalWorks Submission

## 2018-10-30 NOTE — XMS REPORT
[facility]

 Created on: 2018



Oliver Owens

External Reference #: [id number]

: 1995

Sex: Male



Demographics







 Address  [address] [place]  [address]

 

 Preferred Language  Unknown

 

 Marital Status  Unknown

 

 Latter-day Affiliation  Unknown

 

 Race  Unknown

 

 Ethnic Group  Unknown





Author







 Author  PATTY MELGOZA

 

 [facility]

 

 Address  [address] [place]  [address]



 

 Phone  [phone]







Care Team Providers







 Care Team Member Name  Role  Phone

 

 RHONAPATTY  Unavailable  [phone]







PROBLEMS







 Type  Condition  ICD9-CM Code  MYZ34-AV Code  Onset Dates  Condition Status  
SNOMED Code

 

 Problem  Schizoaffective disorder, bipolar type     F25.0     Active  85560399

 

 Problem  ADD (attention deficit disorder)     F90.0     Active  631717878

 

 Problem  Social anxiety disorder     F40.10     Active  72302664

 

 Problem  Attention deficit hyperactivity disorder (ADHD), predominantly 
inattentive type     F90.0     Active  30856695

 

 Problem  Impulse disorder, unspecified     F63.9     Active  56999355

 

 Problem  Hypercholesterolemia with hyperglyceridemia     E78.2     Active  
229401259

 

 Problem  Anxiety     F41.9     Active  97012499

 

 Problem  Bipolar disorder, current episode mixed, unspecified     F31.60     
Active  [id number]







ALLERGIES







 Substance  Reaction  Event Type  Date  Status

 

 Cipro  hives  Drug Allergy  01 Sep, 2017  Active







ENCOUNTERS







 Encounter  Location  Date  Diagnosis

 

 [facility]  [address] [place] [address]40-
9432     

 

 [facility]  [address] [place] [address] 82674-
6196     

 

 [facility]  [address] [place] [address] 06754-
7317    Schizoaffective disorder, bipolar type F25.0 ; Impulse 
disorder, unspecified F63.9 ; Attention deficit hyperactivity disorder (ADHD), 
inattentive type, mild F90.0 and Social anxiety disorder F40.10

 

 [facility]  [address] [address]
7060    Schizoaffective disorder, bipolar type F25.0 ; Impulse 
disorder, unspecified F63.9 ; Attention deficit hyperactivity disorder (ADHD), 
inattentive type, mild F90.0 and Social anxiety disorder F40.10

 

 [facility]  [address] [address]-
3813  13 Dec, 2017  Schizoaffective disorder, bipolar type F25.0 ; Impulse 
disorder, unspecified F63.9 ; Attention deficit hyperactivity disorder (ADHD), 
inattentive type, mild F90.0 and Social anxiety disorder F40.10

 

 [facility]  [id number] [place] [address] 98451-
9815  11 Dec, 2017   

 

 [facility]  [id number] [place] [address] 97023-
0481    Schizoaffective disorder, bipolar type F25.0

 

 [facility]  [address] [place] [address] 80290-
9339    Schizoaffective disorder, bipolar type F25.0 ; Impulse 
disorder, unspecified F63.9 ; Attention deficit hyperactivity disorder (ADHD), 
inattentive type, mild F90.0 and Social anxiety disorder F40.10

 

 [place] [address] [place] [address] 25544-
6504  12 Oct, 2017  Schizoaffective disorder, bipolar type F25.0 ; Impulse 
disorder, unspecified F63.9 ; Attention deficit hyperactivity disorder (ADHD), 
inattentive type, mild F90.0 and Social anxiety disorder F40.10

 

 [facility]  [address] [address]-
5149  22 Sep, 2017   

 

 [facility]  [id number] [place] [address] 75482-
8953  08 Sep, 2017  Schizoaffective disorder, bipolar type F25.0

 

 [facility]  [id number] [place] [address] 08064-
8949  07 Sep, 2017  Other long term (current) drug therapy Z79.899

 

 [facility]  [address] [place] [address] 18117-
2378  01 Sep, 2017  Schizoaffective disorder, bipolar type F25.0 ; Impulse 
disorder, unspecified F63.9 ; Attention deficit hyperactivity disorder (ADHD), 
predominantly inattentive type F90.0 ; Anxiety F41.9 and Other long term (
current) drug therapy Z79.899

 

 [facility]  [address] [place] [address] 94041-
4097  11 Aug, 2017   

 

 [facility]  [id number] [place] [address] 59757-
9340    Hypercholesterolemia with hyperglyceridemia E78.2

 

 [facility]  [id number] [place] [address] 83242-
8395    Schizoaffective disorder, bipolar type F25.0 and Impulse 
disorder, unspecified F63.9

 

 [facility]  [id number] [place] [address] 58083-
7824    Hypercholesterolemia with hyperglyceridemia E78.2 ; Impulse 
disorder, unspecified F63.9 ; Bipolar disorder, current episode mixed, 
unspecified F31.60 ; Schizoaffective disorder, bipolar type F25.0 and ADD (
attention deficit disorder) F90.0

 

 [place] [id number] [place] [address] 52504-
6502  17 Mar, 2017   

 

 [facility]  [id number] [place] [address] 80709-
4367  13 Mar, 2017   

 

 [facility]  [id number] [place] [address] 63706-
1767  08 Mar, 2017  Schizoaffective disorder, bipolar type F25.0

 

 [facility]  [address] [place] [address] 23068-
4628  06 Mar, 2017   

 

 [facility]  [id number] [place] [address] 34895-
4951  06 Mar, 2017   

 

 [facility]  [id number] [place] [address] 72454-
6630  02 Mar, 2017  Schizoaffective disorder, bipolar type F25.0

 

 [facility]  [id number] [place] [address] 35590-
0995  02 Mar, 2017  Schizoaffective disorder, bipolar type F25.0

 

 [facility]  [address] [place] [address] 67034-
0762  10 Nov, 2016   

 

 [facility]  [id number] [place] [address] 42469-
0303     

 

 [facility]  [id number] [place] [address] 57385-
9234    Schizoaffective disorder, bipolar type F25.0 and Other long 
term (current) drug therapy Z79.899

 

 [facility]  [id number] [place] [address] 21744-
8547    ADD (attention deficit disorder) F90.0

 

 [place] [id number] [place] [address] 78076-
8662    ADD (attention deficit disorder) F90.0

 

 [facility]  [id number] [place] [address] 26292-
3704    Schizoaffective disorder, bipolar type F25.0 and ADD (
attention deficit disorder) F90.0

 

 [facility]  [id number] [place] [address] 43080-
0298     

 

 [facility]  [address] [place] [address] 72369-
4705  27 Oct, 2015   

 

 [facility]  [id number] [place] [address] 07168-
6541  09 Oct, 2015  Encounter for long-term (current) use of other medications 
Z79.899

 

 [facility]  [address] [place] [address] 56790-
9612  07 Oct, 2015  Schizoaffective disorder, bipolar type F25.0 ; Anxiety 
disorder, unspecified F41.9 ; Impulse disorder, unspecified F63.9 and Encounter 
for long-term (current) use of other medications Z79.899

 

 [facility]  [id number] [place] [address] 91454-
2938  26 Aug, 2015   

 

 [facility] [place] [facility]  [address] [id number] [place]-
1215  26 Aug, 2015   

 

 [facility] [place] [facility]  [address] [id number] [place] [address]-
3814     

 

 [facility] [place] [facility]  [address] [facility] [id number] [place] [address]-
1601     

 

 [facility] [place] [facility]  [address] [facility] [id number] [place] [address]-
4975  12 May, 2015   

 

 [facility] [place] [facility]  [address] [id number] [place] [address]-
0544     

 

 [facility] [place] [facility]  [address] [facility] [id number] [place] [address]-
0968     

 

 [facility] [place] [facility]  [address] [facility] [id number] [place] [address]-
0923  04 Mar, 2015   

 

 [facility] [place] [facility]  [address] [facility] [id number] [address]-
7359  04 Mar, 2015   

 

 [facility] [place] [facility]  [address] [facility] [id number] [place] [address]-
8404     

 

 [facility] [place] [facility]  [address] [facility] [id number] [place] [address]-
0464     

 

 [facility] [place] [facility]  [address] [facility] [id number] [address]-
5049     

 

 [facility] [place] [facility]  [address] [facility] [id number] [address]-
7551     

 

 [facility] [place] [facility]  [address] [facility] [id number] [place] [address]-
7225     

 

 [facility] [place] [facility]  [address] [facility] [id number] [place]-
0688     

 

 [facility] [place] [facility]  [address] [facility] [id number] [place] [address]-
6617     

 

 [facility] [place] [facility]  [address] [id number] [place] [address]-
0126     

 

 [facility] [place] [facility]  [address] [id number] [place] [address]-
8852     

 

 [facility] [place] [facility]  [address] [id number] [place] [address]-
8511     

 

 [facility] [place] [facility]  [address] [id number] [place] [address]-
9567  22 Dec, 2014   

 

 [facility] [place] [facility]  [address] [id number] [place]-
1820  22 Dec, 2014   

 

 [facility] [place] [facility]  [address] [id number] [place] [address]-
1888  02 Dec, 2014   

 

 [facility] [place] [facility]  [address] [id number] [place]-
6008  02 Dec, 2014   

 

 [facility] [place] [facility]  [address] [id number] [place] [address]-
9691  01 Dec, 2014   

 

 [facility] [place] [facility]  [address] [id number] [place]
4850  01 Dec, 2014   

 

 [facility] [place] [facility]  [address] [id number] [place] [address]-
5344     

 

 [facility] [place] [facility]  [address] [id number] [place] [address]-
8504     

 

 [facility] [place] [facility]  [address] [id number] [place] [address]-
6129     

 

 [facility] [place] [facility]  [address] [id number] [place] [address]-
6283     

 

 [facility] [place] [facility]  [address] [id number] [place] [address]-
3800  30 Oct, 2014   

 

 [facility] [place] [facility]  [address] [id number] [place] [address]-
3218  30 Oct, 2014   

 

 [facility] [place] [facility]  [address] [id number] [place] [address]-
1193  27 Oct, 2014   

 

 [facility] [place] [facility]  [address] [id number] [place] [address]-
9876  27 Oct, 2014   

 

 [facility] [place] [facility]  [address] [id number] [place] [address]-
8749  06 Oct, 2014   

 

 [facility] [place] [facility]  [address] [id number] [place] [address]-
7698  06 Oct, 2014   

 

 [facility] [place] [facility]  [address] [id number] [place] [address]-
3143  03 Oct, 2014   

 

 [facility] [place] [facility]  [address] [id number] [place] [address]-
8793  03 Oct, 2014   

 

 [facility] [place] [facility]  [address] [id number] [place] [address]-
6337  05 Sep, 2014   

 

 [facility] [place] [facility]  [address] [id number] [place] [address]-
0113  05 Sep, 2014   

 

 [facility] [place] [facility]  [address] [id number] [place] [address]-
5573  05 Sep, 2014   

 

 [facility] [place] [facility]  [address] [id number] [place] [address]-
1920  05 Sep, 2014   

 

 [facility] [place] [facility]  [address] [id number] [place] [address]-
9155  04 Aug, 2014   

 

 [facility] [place] [facility]  [address] [id number][place] [address]-
2329  04 Aug, 2014   

 

 [facility] [place] [facility]  [address] [id number] [address]-
6654     

 

 [facility] [place] [facility]  [address] [id number] [place] [address]-
3215     

 

 [facility] [place] [facility]  [address] [id number] [place] [address]-
1021     

 

 [facility] [place] [facility]  [address] [id number] [place] [address]-
4591     

 

 [facility] [place] [facility]  [address] [id number] [place] [address]-
0132     

 

 [facility] [place] [facility]  [address] [id number] [place] [address]-
3857     

 

 [facility] [place] [facility]  [address] [id number] [place] [address]-
7783     

 

 [facility] [place] [facility]  [address] [id number] [place] [address]-
5962     

 

 [facility] [place] [facility]  [address] [id number] [place] [address]-
4560     

 

 [facility] [place] [facility]  [address] [id number] [place] [address]-
3781     

 

 [facility] [place] [facility]  [address] [id number] [place] [address]-
9167  09 May, 2014   

 

 [facility] [place] [facility]  [address] [id number] [place] [address]-
1020  09 May, 2014   

 

 [facility] [place] [facility]  [address] [id number] [place] [address]-
7929     

 

 [facility] [place] [facility]  [address] [id number] [place] [address]-
3539     

 

 [facility] [place] [facility]  [address] [facility] [id number] [place] [address]-
3892     

 

 [facility] [place] [facility]  [address] [id number] [place] [address]-
9240     

 

 [facility] [place] [facility]  [address] [id number] [place] [address]-
8775     

 

 [facility] [place] [facility]  [address] [id number] [place] [address]-
0686  06 Mar, 2014   

 

 [facility] [place] [facility]  [address] [id number] [place] [address]-
0486  06 Mar, 2014   

 

 [facility] [place] [facility]  [address] [id number] [place] [address]-
9814     

 

 [facility] [place] [facility]  [address] [id number] [place] [address]-
7167     

 

 [facility]  [address] [id number] [place] [address]-
1041  10 Chidi, 2014   

 

 [facility]  [address] [id number] [place] [address]-
8589  10 Chidi, 2014   

 

 [facility]  [address] [id number] [place] [address]-
3998  23 Dec, 2013   

 

 [facility]  [address] [id number] [place] [address]-
2418  23 Dec, 2013   

 

 [facility]  [address] [id number] [place] [address]-
9402  20 Dec, 2013   

 

 [facility]  [address] [id number] [place] [address]-
1239  20 Dec, 2013   

 

 [facility]  [address] [id number] [place] [address]-
4419  19 Dec, 2013   

 

 [facility] [place] [facility]  [address] [id number] [place] [address]-
8427  05 Dec, 2013   

 

 [facility]  [address] [id number] [place]
3138  05 Dec, 2013   

 

 [facility] [place] [facility]  [address] [id number] [place] [address]-
8604  03 Dec, 2013   

 

 [facility] [place] [facility]  [address] [id number] [address]-
3373  03 Dec, 2013   

 

 [facility] [place] [facility]  [address] [id number] [place] [address]-
9878  18 Sep, 2013   

 

 [facility] [place] [facility]  [address] [id number] [place] [address]-
9966     

 

 [facility] [place] [facility]  [address] [id number][place] [address]-
2546     

 

 [facility] [place] [facility]  [address] [id number][place] [address]-
2255     

 

 [facility]  [address] [id number] [place] [address]-
9993  09 May, 2013   

 

 [facility]  [address] [id number] [place] [address]-
8196     

 

 [facility]  [address] [facility] [id number] [place] [address]-
5011  18 Mar, 2013   

 

 [facility]  [address] [id number] [place] [address]-
3033  07 Mar, 2013   

 

 [facility]  [address] [id number] [place] [address]-
7973  05 Mar, 2013   

 

 [facility]  [address] [facility] [id number] [place] [address]-
5968     

 

 [facility]  [address] [facility] [id number] [place] [address]-
0469     

 

 [facility]  [address] [facility] [id number] [place] [address]-
6130     

 

 [facility]  [address] [facility] [id number] [place] [address]-
2507     

 

 [facility]  [address] [facility] [id number] [place] [address]-
8827  18 Dec, 2012   

 

 [facility]  [address] [id number] [place] [address]-
7007  18 Dec, 2012   

 

 [facility] [place] [facility]  [address] [id number][place] [address]-
2546  06 Dec, 2012   

 

 [facility] [place] [facility]  [address] [id number] [place] [address]-
7911  04 Dec, 2012   

 

 [facility] [place] [facility]  [address] [facility] [id number] [place] [address]-
7693  04 Dec, 2012   

 

 [facility]  [address] [facility] [id number] [place] [address]-
1468     

 

 [facility] [place] [facility]  [address] [id number] [place] [address]-
8498     

 

 [facility] [place] [facility]  [address] [id number] [place] [address]-
8617  31 Oct, 2012   

 

 [facility] [place] [facility]  [address] [id number] [place] [address]-
5228  31 Oct, 2012   

 

 [facility] [place] [facility]  [address] [id number] [place] [address]-
1427  26 Oct, 2012   

 

 [facility] [place] [facility]  [address] [id number] [place]
2868  26 Oct, 2012   

 

 [facility] [place] [facility]  [address] [id number] [place] [address]-
2006  23 Oct, 2012   

 

 [facility] [place] [facility]  [address] [id number] [place]
3791  23 Oct, 2012   

 

 [facility] [place] [facility]  [address] [id number] [place] [address]-
5927  18 Sep, 2012   

 

 [facility] [place] [facility]  [address] [id number] [place] [address]-
1884  23 Aug, 2012   

 

 [facility] [place] [facility]  [address] [id number] [place] [address]-
7993  22 Aug, 2012   

 

 [facility] [place] [facility]  [address] [id number] [place] [address]-
8832     

 

 [facility] [place] [facility]  [address] [id number] [place] [address]-
2160     

 

 [facility] [place] [facility]  [address] [id number] [place] [address]-
7508     

 

 [facility] [place] [facility]  [address] [id number] [place] [address]-
8058     

 

 [facility] [place] [facility]  [address] [id number] [place] [address]-
4816  01 May, 2012   

 

 [facility] [place] [facility]  [address] [id number] [place] [address]-
8710  27 2012   

 

 [facility] [place] [facility]  [address] [id number] [place] [address]-
8577  10 2012   

 

 [facility] [place] [facility]  [address] [id number] [place] [address]-
4977  14 Mar, 2012   

 

 [facility] [place] [facility]  [address] [id number] [place] [address]-
6968  31 2012   

 

 [facility] [place] [facility]  [address] [id number] [place] [address]-
4171  17 2012   

 

 [facility]  [address] [id number] [place] [address]-
1270  26 Dec, 2011   

 

 [facility] [place] [facility]  [address] [id number] [place] [address]-
5402  21 Dec, 2011   

 

 [facility] [place] [facility]  [address] [id number] [place] [address]-
4368  19 Dec, 2011   

 

 [facility] [place] [facility]  [address] [id number] [place] [address]-
2947  16 Dec, 2011   

 

 [facility] [place] [facility]  [address] [id number] [place] [address]-
2902  16 Dec, 2011   

 

 [facility] [place] [facility]  [address] [id number] [place] [address]-
3412  14 Dec, 2011   

 

 [facility] [place] [facility]  [address] [id number] [place] [address]-
3677  14 Dec, 2011   

 

 [facility] [place] [facility]  [address] [id number] [address]-
2761  17 2011   

 

 [facility] [place] [facility]  [address] [id number] [place] [address]-
4567  17 2011   

 

 [facility] [place] [facility]  [address] [id number] [place] [address]-
4336  18 Oct, 2011   

 

 [facility] [place] [facility]  [address] [id number] [place] [address]-
5171  10 Dec, 2010   

 

 [facility] [place] [facility]  [address] [facility] [id number] [place] [address]-
3836     

 

 [facility]  [address] [facility] [id number] [place] [address]-
4981     

 

 [facility]  [address] [facility] [id number] [place] [address]-
8330  26 Oct, 2010   







IMMUNIZATIONS

No Known Immunizations



SOCIAL HISTORY

Never Assessed



REASON FOR VISIT

 intake--OLIVIER Jaquez MA



PLAN OF CARE







 Activity  Details









  









 Follow Up  4 Weeks Reason: f/u







VITAL SIGNS







 Height  65 in  2017

 

 Weight  419.76 lbs  2017

 

 Heart Rate  68 bpm  2017

 

 Respiratory Rate  18   2017

 

 BMI  69.84 kg/m2  2017

 

 Blood pressure systolic  120 mmHg  2017

 

 Blood pressure diastolic  78 mmHg  2017







MEDICATIONS







 Medication  Instructions  Dosage  Frequency  Start Date  End Date  Duration  
Status

 

 Lithium Carbonate 300 MG  Orally twice a day  2 capsules  12h        45 days  
Active

 

 Lithium Carbonate 600 MG  Orally 2 times a day  1 capsule  12h  01 Sep, 2017  
   30 day(s)  Active

 

 Latuda 40 mg  Orally at suppertime  1 tablet with food              Active







RESULTS

No Results



PROCEDURES

No Known procedures



INSTRUCTIONS





MEDICATIONS ADMINISTERED

No Known Medications



MEDICAL (GENERAL) HISTORY







 Type  Description  Date

 

 Medical History  depression   

 

 Medical History  adhd   

 

 Medical History  bipolar   

 

 Medical History  hyperlipidemia   

 

 Medical History  Hx of heart valve dysfunction but says this has resolved   

 

 Medical History  lactose intolerant   

 

 Hospitalization History  [facility] x4, last incident at age 9

## 2018-10-30 NOTE — XMS REPORT
[facility]

 Created on: 2016



Oliver Owens

External Reference #: [id number]

: 1995

Sex: Male



Demographics







 Address  [address] [place]  [address]

 

 Home Phone  [phone]

 

 Preferred Language  Unknown

 

 Marital Status  Unknown

 

 Taoism Affiliation  Unknown

 

 Race  White

 

 Ethnic Group  Not  or 





Author







 Author  KATHERYN MORGAN

 

 [facility]  [employer]

 

 Address  Unknown

 

 Phone  Unavailable







Care Team Providers







 Care Team Member Name  Role  Phone

 

 KATHERYN MORGAN  CP  Unavailable



                                                                



Allergies

          No Known Allergies                                                   
                                     



Problems

          





 Problem Type  Condition  Code  Onset Dates  Condition Status

 

 Problem  ADD (attention deficit disorder)  F90.0     Active

 

 Problem  Impulse control disorder, unspecified  312.30     Active

 

 Problem  Schizoaffective disorder, bipolar type  F25.0     Active

 

 Problem  Bipolar I disorder, most recent episode (or current) mixed, 
unspecified  296.60     Active



                                                                               
                                       



Medications

          No Known Medications                                                 
                             



Results

          No Known Results                                                     
               



Summary Purpose

          [employer] Submission

## 2018-10-30 NOTE — XMS REPORT
Continuity of Care Document

 Created on: 10/30/2018



PRABHAKAR GALVEZ

External Reference #: [id number]

: 1995

Sex: Male



Demographics







 Address  [address] [place]  [address]

 

 Home Phone  [phone] x

 

 Preferred Language  Unknown

 

 Marital Status  Unknown

 

 Spiritism Affiliation  Unknown

 

 Race  Unknown

 

 Ethnic Group  Unknown





Author







 Author  [facility]

 

 Address  Unknown

 

 Phone  Unavailable



              



Allergies

      





 Active            Description            Code            Type            
Severity            Reaction            Onset            Reported/Identified   
         Relationship to Patient            Clinical Status        

 

 Yes            Cipro                         Drug Allergy                     
                              2011                                  

 

 Yes            Cipro                         Drug Allergy            N/A      
      N/A                         2011                                  



                    



Medications

      



There is no data.                  



Problems

      





 Date Dx Coded            Attending            Type            Code            
Diagnosis            Diagnosed By        

 

 2010                                      296.80            MO BIPOLAR 
NOS                     

 

 2010                                      314.01            ADHD 
COMBINED                     

 

 2010            HILARIA MARTIN DO                         296.80      
      MO BIPOLAR NOS                     

 

 2010            HILARIA MARTIN DO                         314.01      
      ADHD COMBINED                     

 

 2010                                      296.80            MO BIPOLAR 
NOS                     

 

 2010                                      314.01            ADHD 
COMBINED                     

 

 2010            HILARIA MARTIN DO                         296.80      
      MO BIPOLAR NOS                     

 

 2010            HILARIA MARTIN DO                         314.01      
      ADHD COMBINED                     

 

 2010            HILARIA MARTIN DO                         296.80      
      MO BIPOLAR NOS                     

 

 2010            HILARIA MARTIN DO                         314.01      
      ADHD COMBINED                     

 

 2010            KATHERYN MORGAN MD                         296.80        
    MO BIPOLAR NOS                     

 

 2010            KATHERYN MORGAN MD                         314.01        
    ADHD COMBINED                     

 

 2010            HILARIA MARTIN DO                         296.80      
      MO BIPOLAR NOS                     

 

 2010            HILARIA MARTIN DO                         314.01      
      ADHD COMBINED                     

 

 2010            PAPI PRICE JR                         296.80    
        MO BIPOLAR NOS                     

 

 2010            PAPI PRICE JR                         314.01    
        ADHD COMBINED                     

 

 2010            KATHERYN MORGAN MD                         296.80        
    MO BIPOLAR NOS                     

 

 2010            KATHERYN MORGAN MD                         314.01        
    ADHD COMBINED                     

 

 2010            PAPI PRICE JR                         296.80    
        MO BIPOLAR NOS                     

 

 2010            PAPI PRICE JR                         314.01    
        ADHD COMBINED                     

 

 2010            PAPI PRICE JR                         296.80    
        MO BIPOLAR NOS                     

 

 2010            PAPI RPICE JR                         314.01    
        ADHD COMBINED                     

 

 2010            PRICE JR, PAPI S                         296.80    
        MO BIPOLAR NOS                     

 

 2010            ALBERT MCCARTHY, PAPI S                         314.01    
        ADHD COMBINED                     

 

 2010            ALBERT MCCARTHY, PAPI S                         296.80    
        MO BIPOLAR NOS                     

 

 2010            ALBERT MCCARTHY, PAPI S                         314.01    
        ADHD COMBINED                     

 

 2010            PRICE JR, PAPI S                         296.80    
        MO BIPOLAR NOS                     

 

 2010            ALBERT MCCARTHY, PAPI S                         314.01    
        ADHD COMBINED                     

 

 2010            JACQUES PHD, CONNER SERRANO                         296.80   
         MO BIPOLAR NOS                     

 

 2010            JACQUES PHD, CONNER SERRANO                         314.01   
         ADHD COMBINED                     

 

 2010            JACQUES PHD, CONNER SERRANO                         296.80   
         MO BIPOLAR NOS                     

 

 2010            JACQUES PHD, CONNER SERRANO                         314.01   
         ADHD COMBINED                     

 

 2010            ALBERT MCCARTHY, PAPI S                         296.80    
        MO BIPOLAR NOS                     

 

 2010            ALBERT MCCATRHY, PAPI S                         314.01    
        ADHD COMBINED                     

 

 2010            ALBERT MCCARTHY, PAPI S                         296.80    
        MO BIPOLAR NOS                     

 

 2010            ALBERT MCCARTHY, PAPI S                         314.01    
        ADHD COMBINED                     

 

 2010            ALBERT MCCARTHY, PAPI S                         296.80    
        MO BIPOLAR NOS                     

 

 2010            ALBERT MCCARTHY, PAPI S                         314.01    
        ADHD COMBINED                     

 

 2010            ALBERT MCCARTHY, PAPI S                         296.80    
        MO BIPOLAR NOS                     

 

 2010            ALBERT MCCARTHY, PAPI S                         314.01    
        ADHD COMBINED                     

 

 04/15/2011                                      V58.69            MEDICATION 
HIGH RISK                     

 

 04/15/2011            HILARIA MARTIN DO                         V58.69      
      MEDICATION HIGH RISK                     

 

 04/15/2011                                      V58.69            MEDICATION 
HIGH RISK                     

 

 04/15/2011            HILARIA MARTIN DO                         V58.69      
      MEDICATION HIGH RISK                     

 

 04/15/2011            HILARIA MARTIN DO                         V58.69      
      MEDICATION HIGH RISK                     

 

 04/15/2011            KATHERYN MORGAN MD                         V58.69        
    MEDICATION HIGH RISK                     

 

 04/15/2011            HILARIA MARTIN DO                         V58.69      
      MEDICATION HIGH RISK                     

 

 04/15/2011            PAPI PRICE JR                         V58.69    
        MEDICATION HIGH RISK                     

 

 04/15/2011            KATHERYN MORGAN MD                         V58.69        
    MEDICATION HIGH RISK                     

 

 04/15/2011            PAPI PRICE JR                         V58.69    
        MEDICATION HIGH RISK                     

 

 04/15/2011            PAPI PRICE JR                         V58.69    
        MEDICATION HIGH RISK                     

 

 04/15/2011            PAPI PRICE JR                         V58.69    
        MEDICATION HIGH RISK                     

 

 04/15/2011            PAPI PRICE JR                         V58.69    
        MEDICATION HIGH RISK                     

 

 04/15/2011            PAPI PRICE JR S                         V58.69    
        MEDICATION HIGH RISK                     

 

 04/15/2011            JACQUES PHD, CONNER SERRANO                         V58.69   
         MEDICATION HIGH RISK                     

 

 04/15/2011            JACQUES RANDALL, CONNER SERRANO                         V58.69   
         MEDICATION HIGH RISK                     

 

 04/15/2011            PAPI PRICE JR S                         V58.69    
        MEDICATION HIGH RISK                     

 

 04/15/2011            PAPI PRICE JR S                         V58.69    
        MEDICATION HIGH RISK                     

 

 04/15/2011            PAPI PRICE JR S                         V58.69    
        MEDICATION HIGH RISK                     

 

 04/15/2011            PAPI PRICE JR S                         V58.69    
        MEDICATION HIGH RISK                     

 

 2011                                      296.90            MOOD 
DISORDER NOS                     

 

 2011            HILARIA MARTIN DO F                         296.90      
      MOOD DISORDER NOS                     

 

 2011                                      296.90            MOOD 
DISORDER NOS                     

 

 2011            HILARIA MARTIN DO F                         296.90      
      MOOD DISORDER NOS                     

 

 2011            HILARIA MARTIN DO F                         296.90      
      MOOD DISORDER NOS                     

 

 2011            KATHERYN MORGAN MD                         296.90        
    MOOD DISORDER NOS                     

 

 2011            HILARIA MARTIN DO F                         296.90      
      MOOD DISORDER NOS                     

 

 2011            PAPI PRICE JR S                         296.90    
        MOOD DISORDER NOS                     

 

 2011            KATHERYN MORGAN MD                         296.90        
    MOOD DISORDER NOS                     

 

 2011            PAPI PRICE JR S                         296.90    
        MOOD DISORDER NOS                     

 

 2011            PAPI PRICE JR S                         296.90    
        MOOD DISORDER NOS                     

 

 2011            PAPI PRICE JR S                         296.90    
        MOOD DISORDER NOS                     

 

 2011            PAPI PRICE JR S                         296.90    
        MOOD DISORDER NOS                     

 

 2011            PAPI PRICE JR S                         296.90    
        MOOD DISORDER NOS                     

 

 2011            CONNER HANNA PHD                         296.90   
         MOOD DISORDER NOS                     

 

 2011            CONNER HANNA PHD                         296.90   
         MOOD DISORDER NOS                     

 

 2011            PAPI PRICE JR S                         296.90    
        MOOD DISORDER NOS                     

 

 2011            PAPI PRICE JR S                         296.90    
        MOOD DISORDER NOS                     

 

 2011            PAPI PRICE JR S                         296.90    
        MOOD DISORDER NOS                     

 

 2011            PAPI PRICE JR S                         296.90    
        MOOD DISORDER NOS                     

 

 2011                                      314.9            CD ADHD 
UNSPECIFIED HYPERKINETIC SYNDROME                     

 

 2011            HILARIA MARTIN DO F                         314.9       
     CD ADHD UNSPECIFIED HYPERKINETIC SYNDROME                     

 

 2011                                      314.9            CD ADHD 
UNSPECIFIED HYPERKINETIC SYNDROME                     

 

 2011            HILARIA MARTIN DO F                         314.9       
     CD ADHD UNSPECIFIED HYPERKINETIC SYNDROME                     

 

 2011            HILARIA MARTIN DO F                         314.9       
     CD ADHD UNSPECIFIED HYPERKINETIC SYNDROME                     

 

 2011            KATHERYN MORGAN MD                         314.9         
   CD ADHD UNSPECIFIED HYPERKINETIC SYNDROME                     

 

 2011            HILARIA MARTIN DO F                         314.9       
     CD ADHD UNSPECIFIED HYPERKINETIC SYNDROME                     

 

 2011            PAPI PRICE JR                         314.9     
       CD ADHD UNSPECIFIED HYPERKINETIC SYNDROME                     

 

 2011            KATHERYN MORGAN MD                         314.9         
   CD ADHD UNSPECIFIED HYPERKINETIC SYNDROME                     

 

 2011            PAPI PRICE JR                         314.9     
       CD ADHD UNSPECIFIED HYPERKINETIC SYNDROME                     

 

 2011            PAPI PRICE JR                         314.9     
       CD ADHD UNSPECIFIED HYPERKINETIC SYNDROME                     

 

 2011            PAPI PRICE JR                         314.9     
       CD ADHD UNSPECIFIED HYPERKINETIC SYNDROME                     

 

 2011            PAPI PRICE JR                         314.9     
       CD ADHD UNSPECIFIED HYPERKINETIC SYNDROME                     

 

 2011            PAPI PRICE JR                         314.9     
       CD ADHD UNSPECIFIED HYPERKINETIC SYNDROME                     

 

 2011            CONNER HANNA PHD                         314.9    
        CD ADHD UNSPECIFIED HYPERKINETIC SYNDROME                     

 

 2011            CONNER HANNA PHD                         314.9    
        CD ADHD UNSPECIFIED HYPERKINETIC SYNDROME                     

 

 2011            PAPI PRICE JR                         314.9     
       CD ADHD UNSPECIFIED HYPERKINETIC SYNDROME                     

 

 2011            PAPI PRICE JR                         314.9     
       CD ADHD UNSPECIFIED HYPERKINETIC SYNDROME                     

 

 2011            PAPI PRICE JR                         314.9     
       CD ADHD UNSPECIFIED HYPERKINETIC SYNDROME                     

 

 2011            PAPI PRICE JR S                         314.9     
       CD ADHD UNSPECIFIED HYPERKINETIC SYNDROME                     

 

 2011                                      314.00            ADHD 
INATTENTIVE                     

 

 2011            HILARIA MARTIN DO F                         314.00      
      ADHD INATTENTIVE                     

 

 2011                                      314.00            ADHD 
INATTENTIVE                     

 

 2011            HILARIA MARTIN DO F                         314.00      
      ADHD INATTENTIVE                     

 

 2011            HILARIA MARTIN DO F                         314.00      
      ADHD INATTENTIVE                     

 

 2011            KATHERYN MORGAN MD                         314.00        
    ADHD INATTENTIVE                     

 

 2011            HILARIA MARTIN DO F                         314.00      
      ADHD INATTENTIVE                     

 

 2011            PAPI PRICE JR                         314.00    
        ADHD INATTENTIVE                     

 

 2011            KATHERYN MORGAN MD                         314.00        
    ADHD INATTENTIVE                     

 

 2011            ALBERT MCCARTHY, PAPI S                         314.00    
        ADHD INATTENTIVE                     

 

 2011            ALBERT MCCARTHY, PAPI S                         314.00    
        ADHD INATTENTIVE                     

 

 2011            ALBERT MCCARTHY, PAPI S                         314.00    
        ADHD INATTENTIVE                     

 

 2011            ALBERT MCCARTHY, PAPI S                         314.00    
        ADHD INATTENTIVE                     

 

 2011            ALBERT MCCARTHY, PAPI S                         314.00    
        ADHD INATTENTIVE                     

 

 2011            JACQUES RANDALL, CONNER SERRANO                         314.00   
         ADHD INATTENTIVE                     

 

 2011            JACQUES RANDALL, CONNER SERRANO                         314.00   
         ADHD INATTENTIVE                     

 

 2011            ALBERT MCCARTHY, PAPI S                         314.00    
        ADHD INATTENTIVE                     

 

 2011            ALBERT MCCARTHY, PAPI S                         314.00    
        ADHD INATTENTIVE                     

 

 2011            ALBERT MCCARTHY, PAPI S                         314.00    
        ADHD INATTENTIVE                     

 

 2011            ALBERT MCCARTHY, PAPI S                         314.00    
        ADHD INATTENTIVE                     

 

 10/31/2012                                      296.60            MO BIPOLAR I 
MIXED UNSPECIFIED                     

 

 10/31/2012            HILARIA MARTIN DO F                         296.60      
      MO BIPOLAR I MIXED UNSPECIFIED                     

 

 10/31/2012                                      296.60            MO BIPOLAR I 
MIXED UNSPECIFIED                     

 

 10/31/2012            HILARIA MARTIN DO F                         296.60      
      MO BIPOLAR I MIXED UNSPECIFIED                     

 

 10/31/2012            HILARIA MARTIN DO F                         296.60      
      MO BIPOLAR I MIXED UNSPECIFIED                     

 

 10/31/2012            KATHERYN MORGAN MD                         296.60        
    MO BIPOLAR I MIXED UNSPECIFIED                     

 

 10/31/2012            HILARIA MARTIN DO F                         296.60      
      MO BIPOLAR I MIXED UNSPECIFIED                     

 

 10/31/2012            PAPI PRICE JR S                         296.60    
        MO BIPOLAR I MIXED UNSPECIFIED                     

 

 10/31/2012            KATHREYN MORGAN MD                         296.60        
    MO BIPOLAR I MIXED UNSPECIFIED                     

 

 10/31/2012            PAPI PRICE JR S                         296.60    
        MO BIPOLAR I MIXED UNSPECIFIED                     

 

 10/31/2012            PAPI PRICE JR S                         296.60    
        MO BIPOLAR I MIXED UNSPECIFIED                     

 

 10/31/2012            PAPI PRICE JR S                         296.60    
        MO BIPOLAR I MIXED UNSPECIFIED                     

 

 10/31/2012            PAPI PRICE JR S                         296.60    
        MO BIPOLAR I MIXED UNSPECIFIED                     

 

 10/31/2012            PAPI PRICE JR S                         296.60    
        MO BIPOLAR I MIXED UNSPECIFIED                     

 

 10/31/2012            JACQUES RANDALL, CONNER SERRANO                         296.60   
         MO BIPOLAR I MIXED UNSPECIFIED                     

 

 10/31/2012            JACQUES RANDALL, CONNER SERRANO                         296.60   
         MO BIPOLAR I MIXED UNSPECIFIED                     

 

 10/31/2012            PAPI PRICE JR S                         296.60    
        MO BIPOLAR I MIXED UNSPECIFIED                     

 

 10/31/2012            PAPI PRICE JR S                         296.60    
        MO BIPOLAR I MIXED UNSPECIFIED                     

 

 10/31/2012            PAPI PRICE JR S                         296.60    
        MO BIPOLAR I MIXED UNSPECIFIED                     

 

 10/31/2012            PAPI PRICE JR S                         296.60    
        MO BIPOLAR I MIXED UNSPECIFIED                     

 

 2013                                      298.9            P PSYCHOSIS 
NOS                     

 

 2013                                      300.00            AN ANXIETY 
UNSPEC                     

 

 2013                                      307.47            SI DYSSOMNIA 
NOS                     

 

 2013                                      312.30            I IMPULSE 
CONTROL DISORDER NOS                     

 

 2013            WERDER DO, HILARIA F                         298.9       
     P PSYCHOSIS NOS                     

 

 2013            WERDER DO, HILARIA F                         300.00      
      AN ANXIETY UNSPEC                     

 

 2013            WERDER DO, HILARIA F                         307.47      
      SI DYSSOMNIA NOS                     

 

 2013            WERDER DO, HILARIA F                         312.30      
      I IMPULSE CONTROL DISORDER NOS                     

 

 2013            WERDER DO, HILARIA F                         298.9       
     P PSYCHOSIS NOS                     

 

 2013            WERDER DO, HILARIA F                         300.00      
      AN ANXIETY UNSPEC                     

 

 2013            WERDER DO, HILARIA F                         307.47      
      SI DYSSOMNIA NOS                     

 

 2013            WERDER DO, HILARIA F                         312.30      
      I IMPULSE CONTROL DISORDER NOS                     

 

 2013            KATHERYN MORGAN MD                         298.9         
   P PSYCHOSIS NOS                     

 

 2013            KATHERYN MORGAN MD                         300.00        
    AN ANXIETY UNSPEC                     

 

 2013            KATHERYN MORGAN MD                         307.47        
    SI DYSSOMNIA NOS                     

 

 2013            KATHERYN MORGAN MD                         312.30        
    I IMPULSE CONTROL DISORDER NOS                     

 

 2013            PAPI PRICE JR S                         298.9     
       P PSYCHOSIS NOS                     

 

 2013            PAPI PRICE JR                         300.00    
        AN ANXIETY UNSPEC                     

 

 2013            PAPI PRICE JR                         307.47    
        SI DYSSOMNIA NOS                     

 

 2013            PAPI PRICE JR                         312.30    
        I IMPULSE CONTROL DISORDER NOS                     

 

 2013            KATHERYN MORGAN MD                         298.9         
   P PSYCHOSIS NOS                     

 

 2013            KATHERYN MORGAN MD                         300.00        
    AN ANXIETY UNSPEC                     

 

 2013            KATHERYN MORGAN MD                         307.47        
    SI DYSSOMNIA NOS                     

 

 2013            KATHERYN MORGAN MD                         312.30        
    I IMPULSE CONTROL DISORDER NOS                     

 

 2013            PAPI PRICE JR                         298.9     
       P PSYCHOSIS NOS                     

 

 2013            ALBERT MCCARTHY PAPI S                         300.00    
        AN ANXIETY UNSPEC                     

 

 2013            ALBERT MCCARTHY PAPI S                         307.47    
        SI DYSSOMNIA NOS                     

 

 2013            PAPI PRICE JR S                         312.30    
        I IMPULSE CONTROL DISORDER NOS                     

 

 2013            ALBERT MCCARTHY PAPI S                         298.9     
       P PSYCHOSIS NOS                     

 

 2013            ALBERT MCCARTHY PAPI S                         300.00    
        AN ANXIETY UNSPEC                     

 

 2013            PAPI PRICE JR S                         307.47    
        SI DYSSOMNIA NOS                     

 

 2013            ALBERT MCCARTHY PAPI S                         312.30    
        I IMPULSE CONTROL DISORDER NOS                     

 

 2013            ALBERT MCCARTHY PAPI S                         298.9     
       P PSYCHOSIS NOS                     

 

 2013            ALBERT MCCARTHY PAPI S                         300.00    
        AN ANXIETY UNSPEC                     

 

 2013            PAPI PRICE JR S                         307.47    
        SI DYSSOMNIA NOS                     

 

 2013            ALBERT MCCARTHY PAPI S                         312.30    
        I IMPULSE CONTROL DISORDER NOS                     

 

 2013            PAPI PRICE JR S                         298.9     
       P PSYCHOSIS NOS                     

 

 2013            PAPI PRICE JR S                         300.00    
        AN ANXIETY UNSPEC                     

 

 2013            PAPI PRICE JR S                         307.47    
        SI DYSSOMNIA NOS                     

 

 2013            PAPI PRICE JR S                         312.30    
        I IMPULSE CONTROL DISORDER NOS                     

 

 2013            PAPI PRICE JR S                         298.9     
       P PSYCHOSIS NOS                     

 

 2013            PAPI PRICE JR S                         300.00    
        AN ANXIETY UNSPEC                     

 

 2013            PAPI PRICE JR S                         307.47    
        SI DYSSOMNIA NOS                     

 

 2013            PAPI PRICE JR S                         312.30    
        I IMPULSE CONTROL DISORDER NOS                     

 

 2013            CONNER HANNA PHD                         298.9    
        P PSYCHOSIS NOS                     

 

 2013            CONNER HANNA PHD                         300.00   
         AN ANXIETY UNSPEC                     

 

 2013            CONNER HANNA PHD                         307.47   
         SI DYSSOMNIA NOS                     

 

 2013            CONNER HANNA PHD                         312.30   
         I IMPULSE CONTROL DISORDER NOS                     

 

 2013            CONNER HANNA PHD                         298.9    
        P PSYCHOSIS NOS                     

 

 2013            CONNER HANNA PHD                         300.00   
         AN ANXIETY UNSPEC                     

 

 2013            CONNER HANNA PHD                         307.47   
         SI DYSSOMNIA NOS                     

 

 2013            CONNER HANNA PHD                         312.30   
         I IMPULSE CONTROL DISORDER NOS                     

 

 2013            PAPI PRICE JR S                         298.9     
       P PSYCHOSIS NOS                     

 

 2013            PRICE JR, PAPI S                         300.00    
        AN ANXIETY UNSPEC                     

 

 2013            ALBERT MCCARTHY PAPI S                         307.47    
        SI DYSSOMNIA NOS                     

 

 2013            ALBERT MCCARTHY PAPI S                         312.30    
        I IMPULSE CONTROL DISORDER NOS                     

 

 2013            ALBERT MCCARTHY PAPI S                         298.9     
       P PSYCHOSIS NOS                     

 

 2013            PRICE , PAPI S                         300.00    
        AN ANXIETY UNSPEC                     

 

 2013            ALBERT MCCARTHY PAPI S                         307.47    
        SI DYSSOMNIA NOS                     

 

 2013            ALBERT MCCARTHY PAPI S                         312.30    
        I IMPULSE CONTROL DISORDER NOS                     

 

 2013            ALBERT MCCARTHY PAPI S                         298.9     
       P PSYCHOSIS NOS                     

 

 2013            ALBERT MCCARTHY PAPI S                         300.00    
        AN ANXIETY UNSPEC                     

 

 2013            ALBERT MCCARTHY PAPI S                         307.47    
        SI DYSSOMNIA NOS                     

 

 2013            ALBERT MCCARTHY PAPI S                         312.30    
        I IMPULSE CONTROL DISORDER NOS                     

 

 2013            ALBERT MCCARTHY PAPI S                         298.9     
       P PSYCHOSIS NOS                     

 

 2013            ALBERT MCCARTHY PAPI S                         300.00    
        AN ANXIETY UNSPEC                     

 

 2013            ALBERT MCCARTYH PAPI S                         307.47    
        SI DYSSOMNIA NOS                     

 

 2013            ALBERT MCCARTHY PAPI S                         312.30    
        I IMPULSE CONTROL DISORDER NOS                     

 

 2013            KATHERYN MORGAN MD                         295.70        
    P SCHIZO AFFECTIVE                     

 

 2013            PAPI PRICE JR S                         295.70    
        P SCHIZO AFFECTIVE                     

 

 2013            KATHERYN MORGAN MD                         295.70        
    P SCHIZO AFFECTIVE                     

 

 2013            PAPI PRICE JR S                         295.70    
        P SCHIZO AFFECTIVE                     

 

 2013            PAPI PRICE JR S                         295.70    
        P SCHIZO AFFECTIVE                     

 

 2013            PAPI PRICE JR S                         295.70    
        P SCHIZO AFFECTIVE                     

 

 2013            PAPI PRICE JR S                         295.70    
        P SCHIZO AFFECTIVE                     

 

 2013            PAPI PRICE JR S                         295.70    
        P SCHIZO AFFECTIVE                     

 

 2013            JACQUES PHD, CONNER SERRANO                         295.70   
         P SCHIZO AFFECTIVE                     

 

 2013            JACQUES PHD, CONNER SERRANO                         295.70   
         P SCHIZO AFFECTIVE                     

 

 2013            PAPI PRICE JR S                         295.70    
        P SCHIZO AFFECTIVE                     

 

 2013            PAPI PRICE JR S                         295.70    
        P SCHIZO AFFECTIVE                     

 

 2013            PAPI PRICE JR S                         295.70    
        P SCHIZO AFFECTIVE                     

 

 2013            PAPI PRICE JR S                         295.70    
        P SCHIZO AFFECTIVE                     



                                                                               
                                                                               
                                                                               
                                                                               
                                                                               
                                                                 



Procedures

      





 Code            Description            Performed By            Performed On   
     

 

             62675                                  ROUTINE VENIPUNCTURE       
                            2012        

 

             37134                                  LIVER PANEL (LFT)          
                         2012        

 

             51276                                  LIPID PANEL                
                   [id number]                                  GFR CALC (RESULT ONLY)   
                                2012        

 

             86422                                  CBC                        
           2012        

 

             57192                                  LITHIUM                    
               2012        

 

             71692                                  RENAL PROFILE              
                     2012        

 

             80300                                  TSH                        
           2012        

 

             00708                                  ROUTINE VENIPUNCTURE       
                            2013        

 

             23103                                  UA W/ CULTURE IF INDICATED 
                                  2013        

 

             18067                                  CREATININE                 
                  [id number]                                  GFR CALC (RESULT ONLY)   
                                2013        

 

             34798                                  LITHIUM                    
               2013        

 

             83604                                  ROUTINE VENIPUNCTURE       
                            01/10/2014        

 

             22127                                  UA LONG DIP                
                   01/10/2014        

 

             32947                                  CBC                        
           01/10/2014        

 

             79429                                  LITHIUM                    
               01/10/2014        

 

             [id number]                                  GFR CALC (RESULT ONLY)   
                                01/10/2014        

 

             45683                                  CMP                        
           01/10/2014        

 

             36323                                  PSYCH DIAGNOSTIC EVALUATION
                                   2014        

 

             78438                                  PSYTX PT&/FAMILY 30 MINUTES
                                   2014        



                                                          



Results

      





 Test            Result            Range        









 LITHIUM (ESKALITH(R)), SERUM - 10/09/15 11:46         









 Lithium (Eskalith(R)), Serum            0.6 mmol/L            0.6-1.4        









 CBC With Differential/Platelet - 17 10:44         









 WBC            5.3 x10E3/uL            3.4-10.8        

 

 RBC            5.17 x10E6/uL            4.14-5.80        

 

 Hemoglobin            15.0 g/dL            12.6-17.7        

 

 Hematocrit            44.9 %            37.5-51.0        

 

 MCV            87 fL            79-97        

 

 MCH            29.0 pg            26.6-33.0        

 

 MCHC            33.4 g/dL            31.5-35.7        

 

 RDW            13.2 %            12.3-15.4        

 

 Platelets            245 x10E3/uL            150-379        

 

 Neutrophils            62 %                     

 

 Lymphs            27 %                     

 

 Monocytes            9 %                     

 

 Eos            2 %                     

 

 Basos            0 %                     

 

 Neutrophils (Absolute)            3.3 x10E3/uL            1.4-7.0        

 

 Lymphs (Absolute)            1.4 x10E3/uL            0.7-3.1        

 

 Monocytes(Absolute)            0.5 x10E3/uL            0.1-0.9        

 

 Eos (Absolute)            0.1 x10E3/uL            0.0-0.4        

 

 Baso (Absolute)            0.0 x10E3/uL            0.0-0.2        

 

 Immature Granulocytes            0 %                     

 

 Immature Grans (Abs)            0.0 x10E3/uL            0.0-0.1        









 Comp. Metabolic Panel (14) - 17 10:44         









 Glucose, Serum            89 mg/dL            65-99        

 

 BUN            16 mg/dL            6-20        

 

 Creatinine, Serum            0.86 mg/dL            0.76-1.27        

 

 eGFR If NonAfricn Am            122 mL/min/1.73                >59        

 

 eGFR If Africn Am            141 mL/min/1.73                >59        

 

 BUN/Creatinine Ratio            19             9-20        

 

 Sodium, Serum            141 mmol/L            134-144        

 

 Potassium, Serum            4.7 mmol/L            3.5-5.2        

 

 Chloride, Serum            103 mmol/L                    

 

 Carbon Dioxide, Total            24 mmol/L            18-29        

 

 Calcium, Serum            9.7 mg/dL            8.7-10.2        

 

 Protein, Total, Serum            6.9 g/dL            6.0-8.5        

 

 Albumin, Serum            4.6 g/dL            3.5-5.5        

 

 Globulin, Total            2.3 g/dL            1.5-4.5        

 

 A/G Ratio            2.0             1.2-2.2        

 

 Bilirubin, Total            0.3 mg/dL            0.0-1.2        

 

 Alkaline Phosphatase, S            85 IU/L                    

 

 AST (SGOT)            22 IU/L            0-40        

 

 ALT (SGPT)            21 IU/L            0-44        









 Lipid Panel - 17 10:44         









 Cholesterol, Total            194 mg/dL            100-199        

 

 Triglycerides            154 mg/dL            0-149        

 

 HDL Cholesterol            39 mg/dL            >39        

 

 VLDL Cholesterol Santhosh            31 mg/dL            5-40        

 

 LDL Cholesterol Calc            124 mg/dL            0-99        









 TSH W/ FREE T4 - 17 13:33         









 TSH            1.810 uIU/mL            0.450-4.500        

 

 T4,Free(Direct)            1.16 ng/dL            0.82-1.77        









 TSH+Free T4 - 17 13:33         









 TSH            1.810 uIU/mL            0.450-4.500        

 

 T4,Free(Direct)            1.16 ng/dL            0.82-1.77        









 Lithium (Eskalith(R)), Serum - 17 13:33         









 Lithium (Eskalith(R)), Serum            0.8 mmol/L            0.6-1.2        



                            



Encounters

      





 ACCT No.            Visit Date/Time            Discharge            Status    
        Pt. Type            Provider            Facility            Loc./Unit  
          Complaint        

 

 [id number]            2015 13:29:00            2015 23:59:59          
  [facility]            Outpatient            ALBERT MCCARTHY PAPI S                   
                            

 

 [id number]            2015 12:42:00            2015 23:59:59          
  [facility]S            Outpatient            PAPI PRICE JR                   
                            

 

 [id number]            2014 12:29:00            2014 23:59:59          
  CLS            Outpatient            PAPI PRICE JR                   
                            

 

 [id number]            2014 13:11:00            2014 23:59:59          
  CLS            Outpatient            PAPI PRICE JR                   
                            

 

 [id number]            2014 16:12:00            2014 23:59:59          
  CLS            Outpatient            CONNER HANNA PHD                  
                             

 

 [id number]            2014 09:56:00            2014 23:59:59          
  CLS            Outpatient            CONNER HANNA PHD                  
                             

 

 [id number]            2014 14:29:00            2014 23:59:59          
  CLS            Outpatient            PAPI PRICE JR                   
                            

 

 [id number]            2014 14:29:00            2014 23:59:59          
  CLS            Outpatient            PAPI PRICE JR                   
                            

 

 [id number]            2014 14:32:00            2014 23:59:59          
  CLS            Outpatient            PAPI PRICE JR                   
                            

 

 [id number]            2014 13:02:00            2014 23:59:59          
  CLS            Outpatient            PAPI PRICE JR                   
                            

 

 [id number]            2014 00:00:00            2014 23:59:59          
  CLS            Outpatient            PAPI PRICE JR                   
                            

 

 [id number]            2014 08:06:00            2014 23:59:59          
  CLS            Outpatient            KATHERYN MORGAN MD                       
                        

 

 [id number]            2013 10:06:00            2013 23:59:59          
  CLS            Outpatient            PAPI PRICE JR                   
                            

 

 [id number]            2013 10:06:00            2013 23:59:59          
  CLS            Outpatient            KATHERYN MORGAN MD                       
                        

 

 [id number]            2013 14:40:00            2013 23:59:59          
  CLS            Outpatient            HILARIA MARTIN DO                     
                          

 

 [id number]            2013 13:47:00            2013 23:59:59          
  CLS            Outpatient            MARIO KELLEY HILARIA GINA                     
                          

 

 [id number]            2013 13:47:00            2013 23:59:59          
  CLS            Outpatient                                                    
        

 

 [id number]            2012 10:38:00            2012 23:59:59          
  CLS            Outpatient            MARIO KELLEY HILARIA GINA                     
                          

 

 [id number]            10/31/2012 15:31:00            10/31/2012 23:59:59            
CLS            Outpatient            HILARIA MARTIN DO                       
                        

 

 [id number]            10/31/2012 15:31:00            10/31/2012 23:59:59          
  CLS            Outpatient                                                    
        

 

 [id number]            2014 10:17:00            2014 23:59:59    
        CLS            Outpatient                                              
              

 

 [id number]            2018 15:00:00            2018 23:59:59            
CLS            Outpatient            WILLIAM YIFAN KHUSHI                         
[facility]                     

 

 [id number]            2017 13:40:00                                      
Document Registration                                                          
  

 

 [id number]            10/09/2015 11:30:00                                      
Document Registration                                                          
  

 

 [id number]            2017 08:06:00                                   
   Document Registration                                                       
     

 

 [id number]            2017 08:07:00                                   
   Document Registration

## 2018-10-30 NOTE — XMS REPORT
[facility]

 Created on: 2016



Oliver Owens

External Reference #: [id number]

: 1995

Sex: Male



Demographics







 Address  [address] [place]  [address]

 

 Home Phone  [phone]

 

 Preferred Language  Unknown

 

 Marital Status  Unknown

 

 Islam Affiliation  Unknown

 

 Race  White

 

 Ethnic Group  Not  or 





Author







 KATHERYN Carty

 

 [facility]  [employer]

 

 Address  Unknown

 

 Phone  Unavailable







Care Team Providers







 Care Team Member Name  Role  Phone

 

 KATHERYN MORGAN  CP  Unavailable



                                                                



Allergies

          No Known Allergies                                                   
                                     



Problems

          





 Problem Type  Condition  Code  Onset Dates  Condition Status

 

 Problem  ADD (attention deficit disorder)  F90.0     Active

 

 Problem  Impulse control disorder, unspecified  312.30     Active

 

 Problem  Schizoaffective disorder, bipolar type  F25.0     Active

 

 Problem  Bipolar I disorder, most recent episode (or current) mixed, 
unspecified  296.60     Active

 

 Assessment  ADD (attention deficit disorder)  F90.0     Active



                                                                               
                                                 



Medications

          





 Medication  Code System  Code  Instructions  Start Date  End Date  Status  
Dosage

 

 Vyvanse  NDC  [address]-04  30 MG Orally Once a day  May 12, 2015        1 
capsule



                                                                              



Results

          No Known Results                                                     
               



Summary Purpose

          [employer] Submission